# Patient Record
Sex: MALE | Race: ASIAN | Employment: FULL TIME | URBAN - METROPOLITAN AREA
[De-identification: names, ages, dates, MRNs, and addresses within clinical notes are randomized per-mention and may not be internally consistent; named-entity substitution may affect disease eponyms.]

---

## 2020-06-18 ENCOUNTER — OFFICE VISIT (OUTPATIENT)
Dept: PRIMARY CARE CLINIC | Age: 50
End: 2020-06-18

## 2020-06-18 VITALS
RESPIRATION RATE: 16 BRPM | WEIGHT: 174.6 LBS | SYSTOLIC BLOOD PRESSURE: 145 MMHG | HEIGHT: 68 IN | OXYGEN SATURATION: 99 % | HEART RATE: 62 BPM | DIASTOLIC BLOOD PRESSURE: 90 MMHG | BODY MASS INDEX: 26.46 KG/M2 | TEMPERATURE: 97.6 F

## 2020-06-18 DIAGNOSIS — J34.2 DEVIATED NASAL SEPTUM: ICD-10-CM

## 2020-06-18 DIAGNOSIS — Z12.5 PROSTATE CANCER SCREENING: ICD-10-CM

## 2020-06-18 DIAGNOSIS — L85.3 DRY SKIN: ICD-10-CM

## 2020-06-18 DIAGNOSIS — Z00.00 ANNUAL PHYSICAL EXAM: Primary | ICD-10-CM

## 2020-06-18 DIAGNOSIS — E55.9 VITAMIN D DEFICIENCY: ICD-10-CM

## 2020-06-18 DIAGNOSIS — R53.83 FATIGUE, UNSPECIFIED TYPE: ICD-10-CM

## 2020-06-18 RX ORDER — AMMONIUM LACTATE 12 G/100G
CREAM TOPICAL 2 TIMES DAILY
Qty: 280 G | Refills: 1 | Status: SHIPPED | OUTPATIENT
Start: 2020-06-18 | End: 2020-09-21 | Stop reason: SDUPTHER

## 2020-06-18 RX ORDER — AMMONIUM LACTATE 12 G/100G
CREAM TOPICAL 2 TIMES DAILY
Qty: 280 G | Refills: 1 | Status: SHIPPED | OUTPATIENT
Start: 2020-06-18 | End: 2020-06-18 | Stop reason: SDUPTHER

## 2020-06-18 NOTE — PROGRESS NOTES
Chief Complaint   Patient presents with   [facility].     no other concerns       1. Have you been to the ER, urgent care clinic since your last visit? Hospitalized since your last visit? No    2. Have you seen or consulted any other health care providers outside of the [address] [place] [address] since your last visit? Include any pap smears or colon screening.  No

## 2020-06-18 NOTE — PROGRESS NOTES
Yunior Solomon is a 44 y.o.  male and presents with     Chief Complaint   Patient presents with   [facility].     no other concerns     Pt is here for a physical.  Father had DM. He  at 67 off Tuberculosis  Mothers dad had DM. Pt does not smoke or drink alcohol. Pt says in early 80 when he was in [place] , he had anemia, he had EGD and colonoscopy done and they were normal. They could not find cause of anemia  Pt has h/o high chol. Pt eats white meat and chicken and fish. No chest pain, SOB. Once in a while he notices some blood in his stool and occasional constipation. Last TDAP ws 7 years back. 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 Pt feels occasional fatigue, daughter has thyroid problems. Pt complains of dry skin on his legs  He snores occasionally , not loud. History reviewed. No pertinent past medical history. History reviewed. No pertinent surgical history. Current Outpatient Medications   Medication Sig    ammonium lactate (AMLACTIN) 12 % topical cream Apply  to affected area two (2) times a day. rub in to affected area well     No current facility-administered medications for this visit. Health Maintenance   Topic Date Due    DTaP/Tdap/Td series (1 - Tdap) 1989    Lipid Screen  2008    Shingrix Vaccine Age 50> (1 of 2) 2018    FOBT Q1Y Age 54-65  2018    Influenza Age 5 to Adult  2020    Pneumococcal 0-64 years  Aged Out       There is no immunization history on file for this patient. No LMP for male patient. Allergies and Intolerances:   No Known Allergies    Family History:   Family History   Problem Relation Age of Onset    Diabetes Father        Social History:   He  reports that he has never smoked. He has never used smokeless tobacco.  He  reports no history of alcohol use.             Review of Systems:   General: negative for - chills, fatigue, fever, weight change  Psych: negative for - anxiety, depression, irritability or mood swings  ENT: negative for - headaches, hearing change, nasal congestion, oral lesions, sneezing or sore throat  Heme/ Lymph: negative for - bleeding problems, bruising, pallor or swollen lymph nodes  Endo: negative for - hot flashes, polydipsia/polyuria or temperature intolerance  Resp: negative for - cough, shortness of breath or wheezing  CV: negative for - chest pain, edema or palpitations  GI: negative for - abdominal pain, change in bowel habits, constipation, diarrhea or nausea/vomiting  : negative for - dysuria, hematuria, incontinence, pelvic pain or vulvar/vaginal symptoms  MSK: negative for - joint pain, joint swelling or muscle pain  Neuro: negative for - confusion, headaches, seizures or weakness  Derm: negative for - dry skin, hair changes, rash or skin lesion changes          Physical:   Vitals:   Vitals:    06/18/20 0814 06/18/20 0840   BP: 145/82 145/90   Pulse: 62    Resp: 16    Temp: 97.6 °F (36.4 °C)    TempSrc: Oral    SpO2: 99%    Weight: 174 lb 9.6 oz (79.2 kg)    Height: 5' 8\" (1.727 m)            Exam:   HEENT- atraumatic,normocephalic, awake, oriented, well nourished, deviated septum to the left  Neck - supple,no enlarged lymph nodes, no JVD, no thyromegaly  Chest- CTA, no rhonchi, no crackles  Heart- rrr, no murmurs / gallop/rub  Abdomen- soft,BS+,NT, no hepatosplenomegaly  Ext - no c/c/edema , dryness on lwer extremities. Neuro- no focal deficits. Power 5/5 all extremities  Skin - warm,dry, no obvious rashes. Review of Data:   LABS:   No results found for: WBC, HGB, HCT, PLT, HGBEXT, HCTEXT, PLTEXT, HGBEXT, HCTEXT, PLTEXT  No results found for: NA, K, CL, CO2, GLU, BUN, CREA  No results found for: CHOL, CHOLX, CHLST, CHOLV, HDL, HDLP, LDL, LDLC, DLDLP, TGLX, TRIGL, TRIGP  No components found for: GPT        Impression / Plan:        ICD-10-CM ICD-9-CM    1.  Annual physical exam Z00.00 V70.0 CBC WITH AUTOMATED DIFF      METABOLIC PANEL, COMPREHENSIVE LIPID PANEL      TSH 3RD GENERATION      AMB POC EKG ROUTINE W/ 12 LEADS, INTER & REP   2. Vitamin D deficiency E55.9 268.9 VITAMIN D, 25 HYDROXY   3. Prostate cancer screening Z12.5 V76.44 PSA, DIAGNOSTIC (PROSTATE SPECIFIC AG)      PSA, DIAGNOSTIC (PROSTATE SPECIFIC AG)   4. Dry skin L85.3 701.1 ammonium lactate (AMLACTIN) 12 % topical cream      DISCONTINUED: ammonium lactate (AMLACTIN) 12 % topical cream   5. Deviated nasal septum J34.2 470    6. Fatigue, unspecified type R53.83 780.79 TSH 3RD GENERATION     Low salt diet. , monitor blood pressure. EKG - shows sinus bradycardia. Explained to patient risk benefits of the medications. Advised patient to stop meds if having any side effects. Pt verbalized understanding of the instructions. I have discussed the diagnosis with the patient and the intended plan as seen in the above orders. The patient has received an after-visit summary and questions were answered concerning future plans. I have discussed medication side effects and warnings with the patient as well. I have reviewed the plan of care with the patient, accepted their input and they are in agreement with the treatment goals. Reviewed plan of care. Patient has provided input and agrees with goals. Follow-up and Dispositions    · Return in about 1 year (around 6/18/2021).          Bernardo Weber MD

## 2020-06-19 ENCOUNTER — TELEPHONE (OUTPATIENT)
Dept: PRIMARY CARE CLINIC | Age: 50
End: 2020-06-19

## 2020-06-19 DIAGNOSIS — D69.6 THROMBOCYTOPENIA (HCC): ICD-10-CM

## 2020-06-19 DIAGNOSIS — R79.89 ELEVATED TSH: Primary | ICD-10-CM

## 2020-06-19 DIAGNOSIS — E55.9 VITAMIN D DEFICIENCY: ICD-10-CM

## 2020-06-19 LAB
25(OH)D3+25(OH)D2 SERPL-MCNC: 17.8 NG/ML (ref 30–100)
ALBUMIN SERPL-MCNC: 4.3 G/DL (ref 3.8–4.9)
ALBUMIN/GLOB SERPL: 1.6 {RATIO} (ref 1.2–2.2)
ALP SERPL-CCNC: 103 IU/L (ref 39–117)
ALT SERPL-CCNC: 13 IU/L (ref 0–44)
AST SERPL-CCNC: 16 IU/L (ref 0–40)
BASOPHILS # BLD AUTO: 0 X10E3/UL (ref 0–0.2)
BASOPHILS NFR BLD AUTO: 1 %
BILIRUB SERPL-MCNC: 0.6 MG/DL (ref 0–1.2)
BUN SERPL-MCNC: 8 MG/DL (ref 6–24)
BUN/CREAT SERPL: 7 (ref 9–20)
CALCIUM SERPL-MCNC: 9.3 MG/DL (ref 8.7–10.2)
CHLORIDE SERPL-SCNC: 104 MMOL/L (ref 96–106)
CHOLEST SERPL-MCNC: 186 MG/DL (ref 100–199)
CO2 SERPL-SCNC: 24 MMOL/L (ref 20–29)
CREAT SERPL-MCNC: 1.17 MG/DL (ref 0.76–1.27)
EOSINOPHIL # BLD AUTO: 0.1 X10E3/UL (ref 0–0.4)
EOSINOPHIL NFR BLD AUTO: 1 %
ERYTHROCYTE [DISTWIDTH] IN BLOOD BY AUTOMATED COUNT: 12.6 % (ref 11.6–15.4)
GLOBULIN SER CALC-MCNC: 2.7 G/DL (ref 1.5–4.5)
GLUCOSE SERPL-MCNC: 94 MG/DL (ref 65–99)
HCT VFR BLD AUTO: 45.3 % (ref 37.5–51)
HDLC SERPL-MCNC: 45 MG/DL
HGB BLD-MCNC: 15.1 G/DL (ref 13–17.7)
IMM GRANULOCYTES # BLD AUTO: 0 X10E3/UL (ref 0–0.1)
IMM GRANULOCYTES NFR BLD AUTO: 0 %
LDLC SERPL CALC-MCNC: 103 MG/DL (ref 0–99)
LYMPHOCYTES # BLD AUTO: 1.8 X10E3/UL (ref 0.7–3.1)
LYMPHOCYTES NFR BLD AUTO: 30 %
MCH RBC QN AUTO: 30.8 PG (ref 26.6–33)
MCHC RBC AUTO-ENTMCNC: 33.3 G/DL (ref 31.5–35.7)
MCV RBC AUTO: 92 FL (ref 79–97)
MONOCYTES # BLD AUTO: 0.6 X10E3/UL (ref 0.1–0.9)
MONOCYTES NFR BLD AUTO: 9 %
MORPHOLOGY BLD-IMP: ABNORMAL
NEUTROPHILS # BLD AUTO: 3.7 X10E3/UL (ref 1.4–7)
NEUTROPHILS NFR BLD AUTO: 59 %
PLATELET # BLD AUTO: 90 X10E3/UL (ref 150–450)
POTASSIUM SERPL-SCNC: 4 MMOL/L (ref 3.5–5.2)
PROT SERPL-MCNC: 7 G/DL (ref 6–8.5)
PSA SERPL-MCNC: 0.6 NG/ML (ref 0–4)
RBC # BLD AUTO: 4.91 X10E6/UL (ref 4.14–5.8)
SODIUM SERPL-SCNC: 139 MMOL/L (ref 134–144)
TRIGL SERPL-MCNC: 191 MG/DL (ref 0–149)
TSH SERPL DL<=0.005 MIU/L-ACNC: 55.79 UIU/ML (ref 0.45–4.5)
VLDLC SERPL CALC-MCNC: 38 MG/DL (ref 5–40)
WBC # BLD AUTO: 6.1 X10E3/UL (ref 3.4–10.8)

## 2020-06-19 RX ORDER — MELATONIN
1000 DAILY
Qty: 90 TAB | Refills: 1 | Status: SHIPPED | OUTPATIENT
Start: 2020-06-19 | End: 2020-09-21 | Stop reason: SDUPTHER

## 2020-06-19 NOTE — PROGRESS NOTES
TSh is very elevated. Pt could have thyroud working less than normal.  I have ordered repeat TSH , free T3 and free T4 that pt needs to do in 2 weeks. Also platelet count are low, It could be lab error, But I want to make sure. I have ordered repeat CBC in 2 weeks. Vitamin D is low. I have sent vitmain D to pharmacy. I would like to see pt in follow up in 3 months.

## 2020-06-19 NOTE — TELEPHONE ENCOUNTER
----- Message from Pamela Mosqueda MD sent at 6/19/2020  8:57 AM EDT -----  Rogelio Grande is very elevated. Pt could have thyroud working less than normal.  I have ordered repeat TSH , free T3 and free T4 that pt needs to do in 2 weeks. Also platelet count are low, It could be lab error, But I want to make sure. I have ordered repeat CBC in 2 weeks. Vitamin D is low. I have sent vitmain D to pharmacy. I would like to see pt in follow up in 3 months.

## 2020-06-19 NOTE — TELEPHONE ENCOUNTER
Results and recommendations discussed with patient, he verbalized his understanding and is set up to have 2 week f/u lab and office visit in 3 mos. Patient has requested that the appointment reminders be sent to his home address. I will do that for him.

## 2020-07-02 DIAGNOSIS — R79.89 ELEVATED TSH: ICD-10-CM

## 2020-07-03 LAB
BASOPHILS # BLD AUTO: 0 X10E3/UL (ref 0–0.2)
BASOPHILS NFR BLD AUTO: 1 %
EOSINOPHIL # BLD AUTO: 0.1 X10E3/UL (ref 0–0.4)
EOSINOPHIL NFR BLD AUTO: 2 %
ERYTHROCYTE [DISTWIDTH] IN BLOOD BY AUTOMATED COUNT: 12.8 % (ref 11.6–15.4)
HCT VFR BLD AUTO: 46.8 % (ref 37.5–51)
HGB BLD-MCNC: 15.1 G/DL (ref 13–17.7)
IMM GRANULOCYTES # BLD AUTO: 0 X10E3/UL (ref 0–0.1)
IMM GRANULOCYTES NFR BLD AUTO: 0 %
LYMPHOCYTES # BLD AUTO: 2.3 X10E3/UL (ref 0.7–3.1)
LYMPHOCYTES NFR BLD AUTO: 38 %
MCH RBC QN AUTO: 30.1 PG (ref 26.6–33)
MCHC RBC AUTO-ENTMCNC: 32.3 G/DL (ref 31.5–35.7)
MCV RBC AUTO: 93 FL (ref 79–97)
MONOCYTES # BLD AUTO: 0.6 X10E3/UL (ref 0.1–0.9)
MONOCYTES NFR BLD AUTO: 10 %
NEUTROPHILS # BLD AUTO: 3 X10E3/UL (ref 1.4–7)
NEUTROPHILS NFR BLD AUTO: 49 %
PLATELET # BLD AUTO: 106 X10E3/UL (ref 150–450)
RBC # BLD AUTO: 5.02 X10E6/UL (ref 4.14–5.8)
T3FREE SERPL-MCNC: 2.5 PG/ML (ref 2–4.4)
T4 FREE SERPL-MCNC: 0.48 NG/DL (ref 0.82–1.77)
TSH SERPL DL<=0.005 MIU/L-ACNC: 57.89 UIU/ML (ref 0.45–4.5)
WBC # BLD AUTO: 6 X10E3/UL (ref 3.4–10.8)

## 2020-07-08 ENCOUNTER — TELEPHONE (OUTPATIENT)
Dept: PRIMARY CARE CLINIC | Age: 50
End: 2020-07-08

## 2020-07-08 DIAGNOSIS — H66.92 OTITIS OF LEFT EAR: ICD-10-CM

## 2020-07-08 DIAGNOSIS — E03.9 ACQUIRED HYPOTHYROIDISM: Primary | ICD-10-CM

## 2020-07-08 DIAGNOSIS — D69.6 THROMBOCYTOPENIA (HCC): ICD-10-CM

## 2020-07-08 RX ORDER — AMOXICILLIN 500 MG/1
500 CAPSULE ORAL 3 TIMES DAILY
Qty: 30 CAP | Refills: 0 | Status: SHIPPED | OUTPATIENT
Start: 2020-07-08 | End: 2020-07-18

## 2020-07-08 RX ORDER — LEVOTHYROXINE SODIUM 50 UG/1
50 TABLET ORAL
Qty: 30 TAB | Refills: 1 | Status: SHIPPED | OUTPATIENT
Start: 2020-07-08 | End: 2020-09-07

## 2020-07-08 NOTE — PROGRESS NOTES
Platelets have improved slightly from 90 thousand to 106 thousand but they are still low. I am referring pt to Hematology. TSH is high and free T4 is low. I am starting pt on levothyroxine 50 mcg po daily and will repeat labs in 6 weeks.

## 2020-07-08 NOTE — TELEPHONE ENCOUNTER
----- Message from Vasu Alexis MD sent at 7/8/2020  9:27 AM EDT -----  Platelets have improved slightly from 90 thousand to 106 thousand but they are still low. I am referring pt to Hematology. TSH is high and free T4 is low. I am starting pt on levothyroxine 50 mcg po daily and will repeat labs in 6 weeks.

## 2020-07-16 ENCOUNTER — TELEPHONE (OUTPATIENT)
Dept: ONCOLOGY | Age: 50
End: 2020-07-16

## 2020-07-16 NOTE — TELEPHONE ENCOUNTER
Patient called to schedule a new pt appointment. Patient scheduled for 8/5. Patient does not want to a virtual appointment he would like to come into the office.

## 2020-08-06 ENCOUNTER — OFFICE VISIT (OUTPATIENT)
Dept: ONCOLOGY | Age: 50
End: 2020-08-06
Payer: COMMERCIAL

## 2020-08-06 VITALS
HEART RATE: 78 BPM | DIASTOLIC BLOOD PRESSURE: 83 MMHG | TEMPERATURE: 97.1 F | SYSTOLIC BLOOD PRESSURE: 152 MMHG | WEIGHT: 171.2 LBS | BODY MASS INDEX: 25.94 KG/M2 | OXYGEN SATURATION: 98 % | HEIGHT: 68 IN

## 2020-08-06 DIAGNOSIS — E03.9 HYPOTHYROIDISM, UNSPECIFIED TYPE: ICD-10-CM

## 2020-08-06 DIAGNOSIS — D69.6 THROMBOCYTOPENIA (HCC): Primary | ICD-10-CM

## 2020-08-06 PROCEDURE — 99243 OFF/OP CNSLTJ NEW/EST LOW 30: CPT | Performed by: INTERNAL MEDICINE

## 2020-08-06 NOTE — PROGRESS NOTES
Deneen Lyles is a 44 y.o. male  Chief Complaint   Patient presents with    New Patient    Thrombocytopenia     1. Have you been to the ER, urgent care clinic since your last visit? Hospitalized since your last visit? no    2. Have you seen or consulted any other health care providers outside of the [address] [place] [address] since your last visit? Include any pap smears or colon screening.  no

## 2020-08-06 NOTE — PROGRESS NOTES
[facility] at [address], [address] [place] [address] Road, [address] [place] [address] Kareen Christine Bras: [phone]  F: [phone]    Reason for Visit:   Rocael Lockhart is a 44 y.o. male who is seen in consultation at the request of Dr. Alex Garcia  for evaluation of thrombocytopenia    History of Present Illness:   Patient is a 44 y.o. male seen for thrombocytopenia    He is healthy, has hypothyroidism and VD deficiency  States he has a h/o anemia and had been on iron supplements  He has has not been taking any OTC supplements. He States he has no bleeding, he rarely notices blood in stool, no bruising, no hematuria. He has had rare chills, he has no fevers, lumps, no belly fullness and no loss an appetite. He has no HA, falls, CP or SOB. He does not eat red meat  No h/o blood transfusions      He states that he had a normal colonoscopy in 2007    Sister has low platelets    PMH and PSH reviewed    Hypothyroidism    Social History     Tobacco Use    Smoking status: Never Smoker    Smokeless tobacco: Never Used   Substance Use Topics    Alcohol use: No      Family History   Problem Relation Age of Onset    Diabetes Father      Current Outpatient Medications   Medication Sig    levothyroxine (SYNTHROID) 50 mcg tablet Take 1 Tab by mouth Daily (before breakfast).  cholecalciferol (VITAMIN D3) (1000 Units /25 mcg) tablet Take 1 Tab by mouth daily.  ammonium lactate (AMLACTIN) 12 % topical cream Apply  to affected area two (2) times a day. rub in to affected area well     No current facility-administered medications for this visit. No Known Allergies     Review of Systems: A complete review of systems was obtained, negative except as described above.     Physical Exam:     Visit Vitals  /83   Pulse 78   Temp 97.1 °F (36.2 °C)   Ht 5' 8\" (1.727 m)   Wt 171 lb 3.2 oz (77.7 kg)   SpO2 98%   BMI 26.03 kg/m²     ECOG PS: 0  General: No distress  Eyes: PERRLA, anicteric sclerae  HENT: Atraumatic, OP clear  Neck: Supple  Lymphatic: No cervical, supraclavicular, or inguinal adenopathy  Respiratory: normal respiratory effort  CV: Normal rate, no peripheral edema  GI: Soft, nontender, nondistended, no masses, no hepatomegaly, no splenomegaly  MS: Normal gait and station. Digits without clubbing or cyanosis. Skin: No rashes, ecchymoses, or petechiae. Normal temperature, turgor, and texture. Psych: Alert, oriented, appropriate affect, normal judgment/insight    Results:     Lab Results   Component Value Date/Time    WBC 6.0 07/02/2020 08:16 AM    HGB 15.1 07/02/2020 08:16 AM    HCT 46.8 07/02/2020 08:16 AM    PLATELET 720 (L) 95/42/9692 08:16 AM    MCV 93 07/02/2020 08:16 AM    ABS. NEUTROPHILS 3.0 07/02/2020 08:16 AM     Lab Results   Component Value Date/Time    Sodium 139 06/18/2020 09:02 AM    Potassium 4.0 06/18/2020 09:02 AM    Chloride 104 06/18/2020 09:02 AM    CO2 24 06/18/2020 09:02 AM    Glucose 94 06/18/2020 09:02 AM    BUN 8 06/18/2020 09:02 AM    Creatinine 1.17 06/18/2020 09:02 AM    GFR est AA 83 06/18/2020 09:02 AM    GFR est non-AA 72 06/18/2020 09:02 AM    Calcium 9.3 06/18/2020 09:02 AM     Lab Results   Component Value Date/Time    Bilirubin, total 0.6 06/18/2020 09:02 AM    ALT (SGPT) 13 06/18/2020 09:02 AM    Alk. phosphatase 103 06/18/2020 09:02 AM    Protein, total 7.0 06/18/2020 09:02 AM    Albumin 4.3 06/18/2020 09:02 AM         Records reviewed and summarized above. Pathology report(s) reviewed above. Radiology report(s) reviewed above.       Assessment:   1) Mild thrombocytopenia    Chronicity unknown  No other CBC abnormalities  No culprit medications    No B symptoms and no h/o alcohol use    Etiologies include pseudothrombocytopenia, liver disease, B12 def, viral infections, ITP and BM disorders    Work up as below    2) Hypothyroidism    3) VD defiicency    4) HM  Due for colonoscopy      Plan:     · Cbc in citrate tube, smear, HIV, Hep C, Hep B, USG abdomen, B12  · RTC 5 weeks VV    I appreciate the opportunity to participate in Mr. [facility] memo.     Signed By: Babatunde Rosen MD

## 2020-08-21 ENCOUNTER — HOSPITAL ENCOUNTER (OUTPATIENT)
Dept: ULTRASOUND IMAGING | Age: 50
Discharge: HOME | End: 2020-08-21
Attending: INTERNAL MEDICINE
Payer: COMMERCIAL

## 2020-08-21 DIAGNOSIS — I51.9 MYXEDEMA HEART DISEASE: ICD-10-CM

## 2020-08-21 DIAGNOSIS — D69.6 THROMBOCYTOPENIA (HCC): ICD-10-CM

## 2020-08-21 DIAGNOSIS — E03.9 MYXEDEMA HEART DISEASE: ICD-10-CM

## 2020-08-21 DIAGNOSIS — E03.9 MYXEDEMA HEART DISEASE: Primary | ICD-10-CM

## 2020-08-21 DIAGNOSIS — I51.9 MYXEDEMA HEART DISEASE: Primary | ICD-10-CM

## 2020-08-21 PROCEDURE — 76700 US EXAM ABDOM COMPLETE: CPT

## 2020-08-25 ENCOUNTER — TELEPHONE (OUTPATIENT)
Dept: ONCOLOGY | Age: 50
End: 2020-08-25

## 2020-08-25 NOTE — TELEPHONE ENCOUNTER
Patient called and stated that he would like to know if his results from the ultrasound are in.  # [phone]

## 2020-08-25 NOTE — TELEPHONE ENCOUNTER
No obvious abnormality on US. Results will all be discussed at upcoming OV--please ensure he also gets his lab work done prior to visit.

## 2020-08-25 NOTE — TELEPHONE ENCOUNTER
Spoke to Pt, HERMINIO verified, Pt advised no obvious abnormalities but would discuss at next office visit. Pt reports he will get labs drawn tomorrow.

## 2020-08-27 LAB
T4 FREE SERPL-MCNC: 1.05 NG/DL (ref 0.82–1.77)
TSH SERPL DL<=0.005 MIU/L-ACNC: 6.15 UIU/ML (ref 0.45–4.5)

## 2020-08-28 LAB
BASOPHILS # BLD AUTO: 0 X10E3/UL (ref 0–0.2)
BASOPHILS NFR BLD AUTO: 1 %
EOSINOPHIL # BLD AUTO: 0.1 X10E3/UL (ref 0–0.4)
EOSINOPHIL NFR BLD AUTO: 1 %
ERYTHROCYTE [DISTWIDTH] IN BLOOD BY AUTOMATED COUNT: 13.2 % (ref 11.6–15.4)
HBV SURFACE AG SERPL QL IA: NEGATIVE
HCT VFR BLD AUTO: 46.4 % (ref 37.5–51)
HCV AB S/CO SERPL IA: <0.1 S/CO RATIO (ref 0–0.9)
HGB BLD-MCNC: 15.5 G/DL (ref 13–17.7)
HIV 1+2 AB+HIV1 P24 AG SERPL QL IA: NON REACTIVE
IMM GRANULOCYTES # BLD AUTO: 0 X10E3/UL (ref 0–0.1)
IMM GRANULOCYTES NFR BLD AUTO: 0 %
LYMPHOCYTES # BLD AUTO: 1.9 X10E3/UL (ref 0.7–3.1)
LYMPHOCYTES NFR BLD AUTO: 34 %
MCH RBC QN AUTO: 30.2 PG (ref 26.6–33)
MCHC RBC AUTO-ENTMCNC: 33.4 G/DL (ref 31.5–35.7)
MCV RBC AUTO: 90 FL (ref 79–97)
MONOCYTES # BLD AUTO: 0.6 X10E3/UL (ref 0.1–0.9)
MONOCYTES NFR BLD AUTO: 10 %
MORPHOLOGY BLD-IMP: ABNORMAL
NEUTROPHILS # BLD AUTO: 3.1 X10E3/UL (ref 1.4–7)
NEUTROPHILS NFR BLD AUTO: 54 %
PATH REV BLD -IMP: ABNORMAL
PATHOLOGIST NAME: ABNORMAL
PLATELET # BLD AUTO: 90 X10E3/UL (ref 150–450)
PLATELET # BLD AUTO: 93 X10E3/UL (ref 150–450)
RBC # BLD AUTO: 5.13 X10E6/UL (ref 4.14–5.8)
VIT B12 SERPL-MCNC: 229 PG/ML (ref 232–1245)
WBC # BLD AUTO: 5.7 X10E3/UL (ref 3.4–10.8)

## 2020-09-01 ENCOUNTER — TELEPHONE (OUTPATIENT)
Dept: ONCOLOGY | Age: 50
End: 2020-09-01

## 2020-09-01 NOTE — TELEPHONE ENCOUNTER
Yes they are in and will be discussed at upcoming [address], please let him know. PLT are 90K.

## 2020-09-07 DIAGNOSIS — E03.9 ACQUIRED HYPOTHYROIDISM: ICD-10-CM

## 2020-09-07 RX ORDER — LEVOTHYROXINE SODIUM 50 UG/1
TABLET ORAL
Qty: 30 TAB | Refills: 0 | Status: SHIPPED | OUTPATIENT
Start: 2020-09-07 | End: 2020-09-21 | Stop reason: SDUPTHER

## 2020-09-09 ENCOUNTER — VIRTUAL VISIT (OUTPATIENT)
Dept: ONCOLOGY | Age: 50
End: 2020-09-09
Payer: COMMERCIAL

## 2020-09-09 DIAGNOSIS — D69.6 THROMBOCYTOPENIA (HCC): Primary | ICD-10-CM

## 2020-09-09 DIAGNOSIS — E53.8 B12 DEFICIENCY: ICD-10-CM

## 2020-09-09 DIAGNOSIS — E03.9 HYPOTHYROIDISM, UNSPECIFIED TYPE: ICD-10-CM

## 2020-09-09 PROCEDURE — 99213 OFFICE O/P EST LOW 20 MIN: CPT | Performed by: INTERNAL MEDICINE

## 2020-09-09 NOTE — PROGRESS NOTES
[facility] at [place] [address] [address], [phone]  F: [phone]    Reason for Visit:   Milton Mccray is a 44 y.o. male who is seen by synchronous (real-time) audio-video technology on 2020 for follow up of  evaluation of thrombocytopenia    History of Present Illness:   Patient is a 44 y.o. male seen for thrombocytopenia    He is healthy, has hypothyroidism and VD deficiency  States he has a h/o anemia and had been on iron supplements  He has has not been taking any OTC supplements. He States he has no bleeding and no change in health since first seen    Here to review results    He does not eat red meat  No h/o blood transfusions      He states that he had a normal colonoscopy in     Sister has low platelets    PMH and PSH reviewed    Hypothyroidism    Social History     Tobacco Use    Smoking status: Never Smoker    Smokeless tobacco: Never Used   Substance Use Topics    Alcohol use: No      Family History   Problem Relation Age of Onset    Diabetes Father      Current Outpatient Medications   Medication Sig    Euthyrox 50 mcg tablet TAKE 1 TABLET BY MOUTH ONCE DAILY BEFORE BREAKFAST    cholecalciferol (VITAMIN D3) (1000 Units /25 mcg) tablet Take 1 Tab by mouth daily.  ammonium lactate (AMLACTIN) 12 % topical cream Apply  to affected area two (2) times a day. rub in to affected area well     No current facility-administered medications for this visit. No Known Allergies     Review of Systems: A complete review of systems was obtained, negative except as described above. Physical Exam:     Due to this being a TeleHealth evaluation, many elements of the physical examination are unable to be assessed.      Constitutional: Appears well-developed and well-nourished in no apparent distress   Mental status: Alert and awake, Oriented to person/place/time, Able to follow commands  Eyes: EOM normal, Sclera normal, No visible ocular discharge  Skin: No significant exanthematous lesions or discoloration noted on facial skin  Psychiatric: Normal affect, normal judgment/insight. No hallucinations       Results:     Lab Results   Component Value Date/Time    WBC 5.7 08/26/2020 08:55 AM    HGB 15.5 08/26/2020 08:55 AM    HCT 46.4 08/26/2020 08:55 AM    PLATELET 93 (LL) 40/38/2036 08:55 AM    MCV 90 08/26/2020 08:55 AM    ABS. NEUTROPHILS 3.1 08/26/2020 08:55 AM     Lab Results   Component Value Date/Time    Sodium 139 06/18/2020 09:02 AM    Potassium 4.0 06/18/2020 09:02 AM    Chloride 104 06/18/2020 09:02 AM    CO2 24 06/18/2020 09:02 AM    Glucose 94 06/18/2020 09:02 AM    BUN 8 06/18/2020 09:02 AM    Creatinine 1.17 06/18/2020 09:02 AM    GFR est AA 83 06/18/2020 09:02 AM    GFR est non-AA 72 06/18/2020 09:02 AM    Calcium 9.3 06/18/2020 09:02 AM     Lab Results   Component Value Date/Time    Bilirubin, total 0.6 06/18/2020 09:02 AM    ALT (SGPT) 13 06/18/2020 09:02 AM    Alk. phosphatase 103 06/18/2020 09:02 AM    Protein, total 7.0 06/18/2020 09:02 AM    Albumin 4.3 06/18/2020 09:02 AM     Lab Results   Component Value Date/Time    Vitamin B12 229 (L) 08/26/2020 08:55 AM    TSH 6.150 (H) 08/26/2020 08:53 AM    Hep C Virus Ab <0.1 08/26/2020 08:55 AM    HIV SCREEN 4TH GENERATION WRFX Non Reactive 08/26/2020 08:55 AM     No results found for: INR, APTT, DDIMSQ, DDIME, [id number], Laverna Sorrento, FDPLT, Carmen Carolina, PRSFLT, [address], [address] [facility], [id number], [id number], [id number], [id number], [id number], [id number], INREXT     SMEAR in citrate tube  Actual platelet count may be somewhat higher than reported due to   aggregation of platelets in this sample. White count and platelets may be decreased due to fibrin strands   in the sample submitted. Records reviewed and summarized above. Pathology report(s) reviewed above. Radiology report(s) reviewed above. USG Abdomen 8/2020  IMPRESSION  IMPRESSION:   1.   Increased hepatic echotexture is suggestive but not diagnostic for hepatic  steatosis. No focal liver lesion or biliary abnormality. 2.  Normal sized spleen. Assessment:   1) Mild thrombocytopenia    Chronicity unknown  No other CBC abnormalities  No culprit medications  Work up is unremarkable though he does have evidence of pseudothrombocytopenia on examination in a citrate tube    He does have mild V B12 def which we will replace though contribution to this mild thrombocytopenia is unlikely      2) Hypothyroidism    3) VD defiicency    4) Vitamin B12 deficiency  Replace orally as below    5) Hepatic steatosis  Discuss with Dr. Walt Sen- consider hepatology referral at some point      Plan:     B12- 1000 mcg po daily  Folic acid daily  RTC 3 months with cbc and B12 levels    I appreciate the opportunity to participate in Mr. Lala hampton. Signed By: Beatriz Cleary MD      I was in the office while conducting this encounter. The patient was at his home. Consent:  He and/or his healthcare decision maker is aware that this patient-initiated Telehealth encounter is a billable service, with coverage as determined by his insurance carrier. He is aware that he may receive a bill and has provided verbal consent to proceed: Yes    Pursuant to the emergency declaration under the [employer] and the [facility], 1135 waiver authority and the [employer] and [employer] Act, this Virtual  Visit was conducted, with patient's (and/or legal guardian's) consent, to reduce the patient's risk of exposure to COVID-19 and provide necessary medical care. Services were provided through a video synchronous discussion virtually to substitute for in-person visit.

## 2020-09-09 NOTE — PROGRESS NOTES
Bessie Montalvo is a 44 y.o. male  Chief Complaint   Patient presents with    Follow-up    Thrombocytopenia     1. Have you been to the ER, urgent care clinic since your last visit? Hospitalized since your last visit? No    2. Have you seen or consulted any other health care providers outside of the [address] [place] [address] since your last visit? Include any pap smears or colon screening.  No

## 2020-09-21 ENCOUNTER — OFFICE VISIT (OUTPATIENT)
Dept: PRIMARY CARE CLINIC | Age: 50
End: 2020-09-21
Payer: COMMERCIAL

## 2020-09-21 VITALS
BODY MASS INDEX: 26.15 KG/M2 | HEART RATE: 57 BPM | SYSTOLIC BLOOD PRESSURE: 131 MMHG | RESPIRATION RATE: 16 BRPM | TEMPERATURE: 97.4 F | WEIGHT: 172 LBS | OXYGEN SATURATION: 100 % | DIASTOLIC BLOOD PRESSURE: 80 MMHG

## 2020-09-21 DIAGNOSIS — E03.9 ACQUIRED HYPOTHYROIDISM: ICD-10-CM

## 2020-09-21 DIAGNOSIS — Z12.11 COLON CANCER SCREENING: ICD-10-CM

## 2020-09-21 DIAGNOSIS — E53.8 B12 DEFICIENCY: Primary | ICD-10-CM

## 2020-09-21 DIAGNOSIS — L85.3 DRY SKIN: ICD-10-CM

## 2020-09-21 DIAGNOSIS — E55.9 VITAMIN D DEFICIENCY: ICD-10-CM

## 2020-09-21 PROCEDURE — 99214 OFFICE O/P EST MOD 30 MIN: CPT | Performed by: INTERNAL MEDICINE

## 2020-09-21 RX ORDER — AMMONIUM LACTATE 12 G/100G
CREAM TOPICAL 2 TIMES DAILY
Qty: 280 G | Refills: 1 | Status: SHIPPED | OUTPATIENT
Start: 2020-09-21 | End: 2021-07-26 | Stop reason: SDUPTHER

## 2020-09-21 RX ORDER — MELATONIN
1000 DAILY
Qty: 90 TAB | Refills: 1 | Status: SHIPPED | OUTPATIENT
Start: 2020-09-21 | End: 2021-03-24 | Stop reason: DRUGHIGH

## 2020-09-21 RX ORDER — LEVOTHYROXINE SODIUM 50 UG/1
50 TABLET ORAL
Qty: 90 TAB | Refills: 1 | Status: SHIPPED | OUTPATIENT
Start: 2020-09-21 | End: 2021-03-24 | Stop reason: DRUGHIGH

## 2020-09-21 NOTE — PROGRESS NOTES
Kelli Dennis is a 44 y.o.  male and presents with     Chief Complaint   Patient presents with    Hypothyroidism    Vitamin D Deficiency    Fatty Liver    Thrombocytopenia    Dry Skin     Pt saw Dr Lizzy Goodrich for low platelets. Work up revealed low vitamin B12 . Pt eats chicken. Pt is taking vitamin B12 OTC. Pt has occasional  Blood stained stool. Pt has not had colonoscopy  yet. Patient had an ultrasound of his abdomen that shows mild fatty liver. He wonders if he could do anything about it. He also has dry skin on his lower extremities and wants refills on his cream.  He is also running low on his thyroid medicine as well as vitamin D and wants refill. No past medical history on file. No past surgical history on file. Current Outpatient Medications   Medication Sig    levothyroxine (Euthyrox) 50 mcg tablet Take 1 Tab by mouth Daily (before breakfast).  cholecalciferol (VITAMIN D3) (1000 Units /25 mcg) tablet Take 1 Tab by mouth daily.  ammonium lactate (AMLACTIN) 12 % topical cream Apply  to affected area two (2) times a day. rub in to affected area well     No current facility-administered medications for this visit. Health Maintenance   Topic Date Due    DTaP/Tdap/Td series (1 - Tdap) 09/18/1989    Shingrix Vaccine Age 50> (1 of 2) 09/18/2018    FOBT Q1Y Age 50-75  09/18/2018    Flu Vaccine (1) 09/01/2020    Lipid Screen  06/18/2025    Pneumococcal 0-64 years  Aged Out       There is no immunization history on file for this patient. No LMP for male patient. Allergies and Intolerances:   No Known Allergies    Family History:   Family History   Problem Relation Age of Onset    Diabetes Father        Social History:   He  reports that he has never smoked. He has never used smokeless tobacco.  He  reports no history of alcohol use.             Review of Systems:   General: negative for - chills, fatigue, fever, weight change  Psych: negative for - anxiety, depression, irritability or mood swings  ENT: negative for - headaches, hearing change, nasal congestion, oral lesions, sneezing or sore throat  Heme/ Lymph: negative for - bleeding problems, bruising, pallor or swollen lymph nodes  Endo: negative for - hot flashes, polydipsia/polyuria or temperature intolerance  Resp: negative for - cough, shortness of breath or wheezing  CV: negative for - chest pain, edema or palpitations  GI: negative for - abdominal pain, change in bowel habits, constipation, diarrhea or nausea/vomiting  : negative for - dysuria, hematuria, incontinence, pelvic pain or vulvar/vaginal symptoms  MSK: negative for - joint pain, joint swelling or muscle pain  Neuro: negative for - confusion, headaches, seizures or weakness  Derm: negative for - dry skin, hair changes, rash or skin lesion changes          Physical:   Vitals:   Vitals:    09/21/20 0826   BP: 131/80   Pulse: (!) 57   Resp: 16   Temp: 97.4 °F (36.3 °C)   SpO2: 100%   Weight: 172 lb (78 kg)           Exam:   HEENT- atraumatic,normocephalic, awake, oriented, well nourished  Neck - supple,no enlarged lymph nodes, no JVD, no thyromegaly  Chest- CTA, no rhonchi, no crackles  Heart- rrr, no murmurs / gallop/rub  Abdomen- soft,BS+,NT, no hepatosplenomegaly  Ext - no c/c/edema   Neuro- no focal deficits. Power 5/5 all extremities  Skin - warm,dry, no obvious rashes. ,  Dry skin over lower extremities, especially the shins.           Review of Data:   LABS:   Lab Results   Component Value Date/Time    WBC 5.7 08/26/2020 08:55 AM    HGB 15.5 08/26/2020 08:55 AM    HCT 46.4 08/26/2020 08:55 AM    PLATELET 93 (LL) 94/88/4733 08:55 AM     Lab Results   Component Value Date/Time    Sodium 139 06/18/2020 09:02 AM    Potassium 4.0 06/18/2020 09:02 AM    Chloride 104 06/18/2020 09:02 AM    CO2 24 06/18/2020 09:02 AM    Glucose 94 06/18/2020 09:02 AM    BUN 8 06/18/2020 09:02 AM    Creatinine 1.17 06/18/2020 09:02 AM     Lab Results   Component Value Date/Time Cholesterol, total 186 06/18/2020 09:02 AM    HDL Cholesterol 45 06/18/2020 09:02 AM    LDL, calculated 103 (H) 06/18/2020 09:02 AM    Triglyceride 191 (H) 06/18/2020 09:02 AM     No components found for: GPT        Impression / Plan:        ICD-10-CM ICD-9-CM    1. B12 deficiency  E53.8 266.2 INTRINSIC FACTOR AB      PARIETAL CELL AB      PARIETAL CELL AB      INTRINSIC FACTOR AB   2. Vitamin D deficiency  E55.9 268.9 VITAMIN D, 25 HYDROXY      cholecalciferol (VITAMIN D3) (1000 Units /25 mcg) tablet      VITAMIN D, 25 HYDROXY   3. Colon cancer screening  Z12.11 V76.51 REFERRAL TO GASTROENTEROLOGY   4. Acquired hypothyroidism  E03.9 244.9 levothyroxine (Euthyrox) 50 mcg tablet   5. Dry skin  L85.3 701.1 ammonium lactate (AMLACTIN) 12 % topical cream         Explained to patient risk benefits of the medications. Advised patient to stop meds if having any side effects. Pt verbalized understanding of the instructions. I have discussed the diagnosis with the patient and the intended plan as seen in the above orders. The patient has received an after-visit summary and questions were answered concerning future plans. I have discussed medication side effects and warnings with the patient as well. I have reviewed the plan of care with the patient, accepted their input and they are in agreement with the treatment goals. Reviewed plan of care. Patient has provided input and agrees with goals. Follow-up and Dispositions    · Return in about 6 months (around 3/21/2021).          Cristian Rasmussen MD

## 2020-09-22 ENCOUNTER — TELEPHONE (OUTPATIENT)
Dept: PRIMARY CARE CLINIC | Age: 50
End: 2020-09-22

## 2020-09-22 LAB — PCA AB SER-ACNC: 50.2 UNITS (ref 0–20)

## 2020-09-22 NOTE — TELEPHONE ENCOUNTER
----- Message from Mateusz Jimenez MD sent at 9/22/2020  3:42 PM EDT -----  vitamin D level is slightly lower than normal but has improved from 17 to 27. 8. would advise taking the vitamin D daily.

## 2020-09-22 NOTE — PROGRESS NOTES
vitamin D level is slightly lower than normal but has improved from 17 to 27. 8. would advise taking the vitamin D daily.

## 2020-09-23 LAB
25(OH)D3 SERPL-MCNC: 27.8 NG/ML (ref 30–100)
IF BLOCK AB SER-ACNC: 1.1 AU/ML (ref 0–1.1)

## 2020-09-24 NOTE — PROGRESS NOTES
Called patient to review lab results. He stated that he understood. Patient also had other labs there were done on that day, they are visible through my chart however those numbers are off by a lot. He would like to know what that means.  Would like a call to explain to him

## 2020-11-25 DIAGNOSIS — D69.6 THROMBOCYTOPENIA (HCC): ICD-10-CM

## 2020-12-02 ENCOUNTER — TELEPHONE (OUTPATIENT)
Dept: ONCOLOGY | Age: 50
End: 2020-12-02

## 2020-12-02 NOTE — TELEPHONE ENCOUNTER
Patient called and stated that he is not able to see the results on my chart and would like to know if they can be released or if the results can be sent to him via email. Informed patient that once Dr. Naomi Roth reviews the results tomorrow, the office can send them. Patient stated that he would like them before the appointment so if he has questions he can ask them during his appointment.

## 2020-12-02 NOTE — TELEPHONE ENCOUNTER
Patient stated that he had his labs last week and would like to make sure the results are in before appointment tomorrow.

## 2020-12-03 ENCOUNTER — VIRTUAL VISIT (OUTPATIENT)
Dept: ONCOLOGY | Age: 50
End: 2020-12-03
Payer: COMMERCIAL

## 2020-12-03 DIAGNOSIS — E53.8 B12 DEFICIENCY: Primary | ICD-10-CM

## 2020-12-03 PROCEDURE — 99213 OFFICE O/P EST LOW 20 MIN: CPT | Performed by: INTERNAL MEDICINE

## 2020-12-03 NOTE — PROGRESS NOTES
[facility] [place] [facility] [place] [address] [address], [address] Felipe [phone]  F: [phone]    Reason for Visit:   Ysabel Parish is a 44 y.o. male who is seen for follow up of  thrombocytopenia    History of Present Illness:   Patient is a 44 y.o. male seen for thrombocytopenia    He is healthy, has hypothyroidism and VD deficiency  States he has a h/o anemia and had been on iron supplements  He has has not been taking any OTC supplements. He was found to have b12 deficiency and started on po b12. He had been taking this until last month when he stopped because of a rash. He feels well, has no bleeding, no sensory changes, no falls, no bleeding. He does not eat red meat  No h/o blood transfusions      He states that he had a normal colonoscopy in     Sister has low platelets    PMH and PSH reviewed    Hypothyroidism    Social History     Tobacco Use    Smoking status: Never Smoker    Smokeless tobacco: Never Used   Substance Use Topics    Alcohol use: No      Family History   Problem Relation Age of Onset    Diabetes Father      Current Outpatient Medications   Medication Sig    levothyroxine (Euthyrox) 50 mcg tablet Take 1 Tab by mouth Daily (before breakfast).  cholecalciferol (VITAMIN D3) (1000 Units /25 mcg) tablet Take 1 Tab by mouth daily.  ammonium lactate (AMLACTIN) 12 % topical cream Apply  to affected area two (2) times a day. rub in to affected area well     No current facility-administered medications for this visit. No Known Allergies     Review of Systems: A complete review of systems was obtained, negative except as described above. Physical Exam:     Due to this being a TeleHealth evaluation, many elements of the physical examination are unable to be assessed.      Constitutional: Appears well-developed and well-nourished in no apparent distress   Mental status: Alert and awake, Oriented to person/place/time, Able to follow commands  Eyes: EOM normal, Sclera normal, No visible ocular discharge  Skin: No significant exanthematous lesions or discoloration noted on facial skin  Psychiatric: Normal affect, normal judgment/insight. No hallucinations       Results:     Lab Results   Component Value Date/Time    WBC 5.7 08/26/2020 08:55 AM    HGB 15.5 08/26/2020 08:55 AM    HCT 46.4 08/26/2020 08:55 AM    PLATELET 93 (LL) 53/32/5024 08:55 AM    MCV 90 08/26/2020 08:55 AM    ABS. NEUTROPHILS 3.1 08/26/2020 08:55 AM     Lab Results   Component Value Date/Time    Sodium 139 06/18/2020 09:02 AM    Potassium 4.0 06/18/2020 09:02 AM    Chloride 104 06/18/2020 09:02 AM    CO2 24 06/18/2020 09:02 AM    Glucose 94 06/18/2020 09:02 AM    BUN 8 06/18/2020 09:02 AM    Creatinine 1.17 06/18/2020 09:02 AM    GFR est AA 83 06/18/2020 09:02 AM    GFR est non-AA 72 06/18/2020 09:02 AM    Calcium 9.3 06/18/2020 09:02 AM     Lab Results   Component Value Date/Time    Bilirubin, total 0.6 06/18/2020 09:02 AM    ALT (SGPT) 13 06/18/2020 09:02 AM    Alk. phosphatase 103 06/18/2020 09:02 AM    Protein, total 7.0 06/18/2020 09:02 AM    Albumin 4.3 06/18/2020 09:02 AM     Lab Results   Component Value Date/Time    Vitamin B12 229 (L) 08/26/2020 08:55 AM    TSH 6.150 (H) 08/26/2020 08:53 AM    Hep C Virus Ab <0.1 08/26/2020 08:55 AM    HIV SCREEN 4TH GENERATION WRFX Non Reactive 08/26/2020 08:55 AM     No results found for: INR, APTT, DDIMSQ, DDIME, [id number], [place], FDPLT, Coral Emily, [address] [facility], [facility], [address], [id number], [facility] [address], [id number], [id number], [id number], [id number], [facility] 11/2020  Platelets 247A,     SMEAR in citrate tube  Actual platelet count may be somewhat higher than reported due to   aggregation of platelets in this sample. White count and platelets may be decreased due to fibrin strands   in the sample submitted. Records reviewed and summarized above. Pathology report(s) reviewed above.   Radiology report(s) reviewed above. [facility] Abdomen 8/2020  IMPRESSION  IMPRESSION:   1. Increased hepatic echotexture is suggestive but not diagnostic for hepatic  steatosis. No focal liver lesion or biliary abnormality. 2.  Normal sized spleen. Assessment:   1) Mild thrombocytopenia    Secondary to B12 deficiency and pseudothrombocytopenia  An immune component is not ruled out  Stable counsts    2) Hypothyroidism    3) VD defiicency    4) Vitamin B12 deficiency/ pernicious anemia  Though B12 absorption is impaired in pernicious anemia high doses of oral B12 are often efficacious in replacement. He is not keen on IM replacement and I think its fine to continue 4443-2388 mcg po daily. His B12 levels have normalized  I strongly advice he gets an EGD      5) Hepatic steatosis        Plan:     B12- 1000 mcg po daily- CONTINUE- Hold off on IM replacement  Folic acid daily  Consider GI referral  RTC 3 months with cbc and B12 levels    I appreciate the opportunity to participate in Mr. Abdirahman Marrero care. Signed By: Meenu Mcconnell MD      I was in the office while conducting this encounter. The patient was at his home. Consent:  He and/or his healthcare decision maker is aware that this patient-initiated Telehealth encounter is a billable service, with coverage as determined by his insurance carrier. He is aware that he may receive a bill and has provided verbal consent to proceed: Yes    Pursuant to the emergency declaration under the [address] and the [facility], [address] waiver authority and the [employer] Act, this Virtual  Visit was conducted, with patient's (and/or legal guardian's) consent, to reduce the patient's risk of exposure to COVID-19 and provide necessary medical care. Services were provided through a video synchronous discussion virtually to substitute for in-person visit.

## 2020-12-03 NOTE — PROGRESS NOTES
Umair Steele is a 44 y.o. male  Chief Complaint   Patient presents with    Follow-up     thrombocytopenia     1. Have you been to the ER, urgent care clinic since your last visit? Hospitalized since your last visit? No    2. Have you seen or consulted any other health care providers outside of the [address] [place] [address] since your last visit? Include any pap smears or colon screening.   Yes, colonoscopy 10/2020

## 2021-03-17 ENCOUNTER — TELEPHONE (OUTPATIENT)
Dept: ONCOLOGY | Age: 51
End: 2021-03-17

## 2021-03-17 NOTE — TELEPHONE ENCOUNTER
Called pt to see if he remembered his apt today 3/17 but pt told me he did not get labs done - therefore I rescheduled him. Pt also stated he got labs done before his last apt in December of 2020 but pt does not see those results uploaded in my chart and would like them to be uploaded asap.     # [phone]

## 2021-03-17 NOTE — TELEPHONE ENCOUNTER
Results are automatically released now, I cannot release them to my knowledge. We can print out report and he can  or mail to his house.

## 2021-03-18 DIAGNOSIS — E55.9 VITAMIN D DEFICIENCY: Primary | ICD-10-CM

## 2021-03-18 DIAGNOSIS — E03.9 ACQUIRED HYPOTHYROIDISM: ICD-10-CM

## 2021-03-18 DIAGNOSIS — E53.8 B12 DEFICIENCY: ICD-10-CM

## 2021-03-24 ENCOUNTER — OFFICE VISIT (OUTPATIENT)
Dept: PRIMARY CARE CLINIC | Age: 51
End: 2021-03-24
Payer: COMMERCIAL

## 2021-03-24 VITALS
DIASTOLIC BLOOD PRESSURE: 77 MMHG | TEMPERATURE: 97.1 F | BODY MASS INDEX: 26.52 KG/M2 | HEART RATE: 64 BPM | RESPIRATION RATE: 16 BRPM | WEIGHT: 175 LBS | SYSTOLIC BLOOD PRESSURE: 125 MMHG | HEIGHT: 68 IN | OXYGEN SATURATION: 99 %

## 2021-03-24 DIAGNOSIS — E53.8 B12 DEFICIENCY: Primary | ICD-10-CM

## 2021-03-24 DIAGNOSIS — E03.9 ACQUIRED HYPOTHYROIDISM: ICD-10-CM

## 2021-03-24 DIAGNOSIS — D69.6 THROMBOCYTOPENIA (HCC): ICD-10-CM

## 2021-03-24 DIAGNOSIS — E55.9 VITAMIN D DEFICIENCY: ICD-10-CM

## 2021-03-24 DIAGNOSIS — D51.0 PERNICIOUS ANEMIA: ICD-10-CM

## 2021-03-24 PROCEDURE — 99214 OFFICE O/P EST MOD 30 MIN: CPT | Performed by: INTERNAL MEDICINE

## 2021-03-24 RX ORDER — LANOLIN ALCOHOL/MO/W.PET/CERES
CREAM (GRAM) TOPICAL DAILY
COMMUNITY
End: 2022-01-17

## 2021-03-24 RX ORDER — LEVOTHYROXINE SODIUM 75 UG/1
75 TABLET ORAL
Qty: 90 TAB | Refills: 1 | Status: SHIPPED | OUTPATIENT
Start: 2021-03-24 | End: 2021-07-26 | Stop reason: SDUPTHER

## 2021-03-24 RX ORDER — CYANOCOBALAMIN (VITAMIN B-12) 2500 MCG
1 TABLET, SUBLINGUAL SUBLINGUAL DAILY
Qty: 90 TAB | Refills: 1 | Status: SHIPPED | OUTPATIENT
Start: 2021-03-24 | End: 2021-04-05 | Stop reason: SDUPTHER

## 2021-03-24 RX ORDER — ACETAMINOPHEN 500 MG
2000 TABLET ORAL 2 TIMES DAILY
Qty: 90 CAP | Refills: 1 | Status: SHIPPED | OUTPATIENT
Start: 2021-03-24 | End: 2021-04-05 | Stop reason: SDUPTHER

## 2021-03-24 NOTE — PROGRESS NOTES
Parminder Olson is a 44 y.o.  male and presents with     Chief Complaint   Patient presents with    Labs    Anemia    Vitamin B12 Deficiency    Vitamin D Deficiency    Hypothyroidism     Patient is here for follow-up on lab results. He did see hematology for low platelets. It was felt that it could be due to low B12 levels secondary to pernicious anemia. Patient does not want to take injections of B12 for pernicious anemia and has been taking oral vitamin B12. Patient has not been taking vitamin D tablets as directed. Patient has had mild weight gain since last visit. Patient also reports some fatigue. TSH levels are elevated. Patient is not sure if the fatigue is related to his work or due to hypothyroidism. No past medical history on file. No past surgical history on file. Current Outpatient Medications   Medication Sig    thiamine HCL (Vitamin B-1) 100 mg tablet Take  by mouth daily.  cyanocobalamin-cobamamide (B-12) 5,000-100 mcg sublingual tablet 1 Tab by SubLINGual route daily.  cholecalciferol (VITAMIN D3) (2,000 UNITS /50 MCG) cap capsule Take 1 Cap by mouth two (2) times a day.  levothyroxine (SYNTHROID) 75 mcg tablet Take 1 Tab by mouth Daily (before breakfast).  ammonium lactate (AMLACTIN) 12 % topical cream Apply  to affected area two (2) times a day. rub in to affected area well     No current facility-administered medications for this visit. Health Maintenance   Topic Date Due    DTaP/Tdap/Td series (1 - Tdap) Never done    Shingrix Vaccine Age 50> (1 of 2) Never done    Colorectal Cancer Screening Combo  Never done    Lipid Screen  06/18/2025    Hepatitis C Screening  Completed    Flu Vaccine  Completed    Pneumococcal 0-64 years  Aged Out       There is no immunization history on file for this patient. No LMP for male patient.         Allergies and Intolerances:   No Known Allergies    Family History:   Family History   Problem Relation Age of Onset    Diabetes Father        Social History:   He  reports that he has never smoked. He has never used smokeless tobacco.  He  reports no history of alcohol use. Review of Systems:   General: negative for - chills, fatigue, fever, weight change  Psych: negative for - anxiety, depression, irritability or mood swings  ENT: negative for - headaches, hearing change, nasal congestion, oral lesions, sneezing or sore throat  Heme/ Lymph: negative for - bleeding problems, bruising, pallor or swollen lymph nodes  Endo: negative for - hot flashes, polydipsia/polyuria or temperature intolerance  Resp: negative for - cough, shortness of breath or wheezing  CV: negative for - chest pain, edema or palpitations  GI: negative for - abdominal pain, change in bowel habits, constipation, diarrhea or nausea/vomiting  : negative for - dysuria, hematuria, incontinence, pelvic pain or vulvar/vaginal symptoms  MSK: negative for - joint pain, joint swelling or muscle pain  Neuro: negative for - confusion, headaches, seizures or weakness  Derm: negative for - dry skin, hair changes, rash or skin lesion changes          Physical:   Vitals:   Vitals:    03/24/21 0813   BP: 125/77   Pulse: 64   Resp: 16   Temp: 97.1 °F (36.2 °C)   TempSrc: Temporal   SpO2: 99%   Weight: 175 lb (79.4 kg)   Height: 5' 8\" (1.727 m)           Exam:   HEENT- atraumatic,normocephalic, awake, oriented, well nourished  Neck - supple,no enlarged lymph nodes, no JVD, no thyromegaly  Chest- CTA, no rhonchi, no crackles  Heart- rrr, no murmurs / gallop/rub  Abdomen- soft,BS+,NT, no hepatosplenomegaly  Ext - no c/c/edema   Neuro- no focal deficits. Power 5/5 all extremities  Skin - warm,dry, no obvious rashes.           Review of Data:   LABS:   Lab Results   Component Value Date/Time    WBC 6.9 03/19/2021 08:41 AM    HGB 15.7 03/19/2021 08:41 AM    HCT 48.7 03/19/2021 08:41 AM    PLATELET 298 (L) 69/45/9577 08:41 AM     Lab Results   Component Value Date/Time    Sodium 139 06/18/2020 09:02 AM    Potassium 4.0 06/18/2020 09:02 AM    Chloride 104 06/18/2020 09:02 AM    CO2 24 06/18/2020 09:02 AM    Glucose 94 06/18/2020 09:02 AM    BUN 8 06/18/2020 09:02 AM    Creatinine 1.17 06/18/2020 09:02 AM     Lab Results   Component Value Date/Time    Cholesterol, total 186 06/18/2020 09:02 AM    HDL Cholesterol 45 06/18/2020 09:02 AM    LDL, calculated 103 (H) 06/18/2020 09:02 AM    Triglyceride 191 (H) 06/18/2020 09:02 AM     No components found for: GPT        Impression / Plan:        ICD-10-CM ICD-9-CM    1. B12 deficiency  E53.8 266.2 cyanocobalamin-cobamamide (B-12) 5,000-100 mcg sublingual tablet   2. Vitamin D deficiency  E55.9 268.9 cholecalciferol (VITAMIN D3) (2,000 UNITS /50 MCG) cap capsule   3. Acquired hypothyroidism  E03.9 244.9 levothyroxine (SYNTHROID) 75 mcg tablet   4. Pernicious anemia  D51.0 281.0 cyanocobalamin-cobamamide (B-12) 5,000-100 mcg sublingual tablet   5. Thrombocytopenia (HCC)  D69.6 287.5      Since patient has pernicious anemia, you may not have good absorption from the gut, will prescribe sublingual B12 to improve B12 levels. Patient does not want B12 injections at this point. Thrombocytopenia-could be secondary to B12 deficiency, may improve after B12 supplementation. Vitamin D deficiency-advised patient to take vitamin D regularly as directed, will increase the dose to 2000 units daily    Hypothyroidism-mild fatigue and weight gain, elevated TSH-we will increase the dose of levothyroxine to 75 mcg daily. Explained to patient risk benefits of the medications. Advised patient to stop meds if having any side effects. Pt verbalized understanding of the instructions. I have discussed the diagnosis with the patient and the intended plan as seen in the above orders. The patient has received an after-visit summary and questions were answered concerning future plans.   I have discussed medication side effects and warnings with the patient as well. I have reviewed the plan of care with the patient, accepted their input and they are in agreement with the treatment goals. Reviewed plan of care. Patient has provided input and agrees with goals. Follow-up and Dispositions    · Return in about 4 months (around 7/24/2021).          Jean Carlos Lara MD

## 2021-03-24 NOTE — PROGRESS NOTES
Chief Complaint   Patient presents with    Labs     1. Have you been to the ER, urgent care clinic since your last visit? Hospitalized since your last visit? No    2. Have you seen or consulted any other health care providers outside of the [address] [place] [address] since your last visit? Include any pap smears or colon screening.  No

## 2021-04-05 DIAGNOSIS — E53.8 B12 DEFICIENCY: ICD-10-CM

## 2021-04-05 DIAGNOSIS — E55.9 VITAMIN D DEFICIENCY: ICD-10-CM

## 2021-04-05 DIAGNOSIS — D51.0 PERNICIOUS ANEMIA: ICD-10-CM

## 2021-04-05 RX ORDER — CYANOCOBALAMIN (VITAMIN B-12) 2500 MCG
1 TABLET, SUBLINGUAL SUBLINGUAL DAILY
Qty: 90 TAB | Refills: 1 | Status: SHIPPED | OUTPATIENT
Start: 2021-04-05

## 2021-04-05 RX ORDER — ACETAMINOPHEN 500 MG
2000 TABLET ORAL 2 TIMES DAILY
Qty: 90 CAP | Refills: 1 | Status: SHIPPED | OUTPATIENT
Start: 2021-04-05 | End: 2021-04-07 | Stop reason: ALTCHOICE

## 2021-04-05 NOTE — TELEPHONE ENCOUNTER
Medications were sent a week ago, pharmacy has on file the B12 is not covered by insurance and would be $10.77. Would you want the patient to take over the counter.      Looks like the patient also sent you a mychart message

## 2021-04-07 DIAGNOSIS — E55.9 VITAMIN D DEFICIENCY: Primary | ICD-10-CM

## 2021-04-07 RX ORDER — MELATONIN
Qty: 180 TAB | Refills: 1 | Status: SHIPPED | OUTPATIENT
Start: 2021-04-07 | End: 2021-07-26 | Stop reason: ALTCHOICE

## 2021-05-03 ENCOUNTER — TELEPHONE (OUTPATIENT)
Dept: ONCOLOGY | Age: 51
End: 2021-05-03

## 2021-05-03 NOTE — TELEPHONE ENCOUNTER
Pt called and stated he wont be able to make it to his apt on 5/5/ as he will be out of town. He requested to have it reschedule sometime in June. Ok to cancel?     Cb# [phone]

## 2021-07-14 ENCOUNTER — TELEPHONE (OUTPATIENT)
Dept: PRIMARY CARE CLINIC | Age: 51
End: 2021-07-14

## 2021-07-14 DIAGNOSIS — E53.8 B12 DEFICIENCY: Primary | ICD-10-CM

## 2021-07-14 DIAGNOSIS — E03.9 ACQUIRED HYPOTHYROIDISM: ICD-10-CM

## 2021-07-14 DIAGNOSIS — D69.6 THROMBOCYTOPENIA (HCC): ICD-10-CM

## 2021-07-14 DIAGNOSIS — E55.9 VITAMIN D DEFICIENCY: ICD-10-CM

## 2021-07-14 NOTE — TELEPHONE ENCOUNTER
Needs lab orders placed       ----- Message from Oswald Boast sent at 7/14/2021 10:17 AM EDT -----  Regarding: Dr. Ireland/Telephone  General Message/Vendor Calls    Caller's first and last name: n/a      Reason for call: Wants blood work done before appt      Callback required yes/no and why: yes      Best contact number(s): [phone]      Details to clarify the request: n/a      Oswald Boast

## 2021-07-26 ENCOUNTER — OFFICE VISIT (OUTPATIENT)
Dept: PRIMARY CARE CLINIC | Age: 51
End: 2021-07-26
Payer: COMMERCIAL

## 2021-07-26 VITALS
BODY MASS INDEX: 26.73 KG/M2 | TEMPERATURE: 97.7 F | HEART RATE: 61 BPM | WEIGHT: 176.4 LBS | RESPIRATION RATE: 16 BRPM | HEIGHT: 68 IN | OXYGEN SATURATION: 100 % | SYSTOLIC BLOOD PRESSURE: 133 MMHG | DIASTOLIC BLOOD PRESSURE: 76 MMHG

## 2021-07-26 DIAGNOSIS — D69.6 THROMBOCYTOPENIA (HCC): ICD-10-CM

## 2021-07-26 DIAGNOSIS — L85.3 DRY SKIN: ICD-10-CM

## 2021-07-26 DIAGNOSIS — E03.9 ACQUIRED HYPOTHYROIDISM: ICD-10-CM

## 2021-07-26 DIAGNOSIS — E55.9 VITAMIN D DEFICIENCY: Primary | ICD-10-CM

## 2021-07-26 DIAGNOSIS — D51.0 PERNICIOUS ANEMIA: ICD-10-CM

## 2021-07-26 PROCEDURE — 99213 OFFICE O/P EST LOW 20 MIN: CPT | Performed by: INTERNAL MEDICINE

## 2021-07-26 RX ORDER — ERGOCALCIFEROL 1.25 MG/1
50000 CAPSULE ORAL
Qty: 12 CAPSULE | Refills: 1 | Status: SHIPPED | OUTPATIENT
Start: 2021-07-26 | End: 2022-01-09

## 2021-07-26 RX ORDER — LEVOTHYROXINE SODIUM 75 UG/1
75 TABLET ORAL
Qty: 90 TABLET | Refills: 1 | Status: SHIPPED | OUTPATIENT
Start: 2021-07-26 | End: 2021-10-07

## 2021-07-26 RX ORDER — AMMONIUM LACTATE 12 G/100G
CREAM TOPICAL 2 TIMES DAILY
Qty: 280 G | Refills: 1 | Status: SHIPPED | OUTPATIENT
Start: 2021-07-26 | End: 2022-01-17

## 2021-07-26 NOTE — PROGRESS NOTES
Chief Complaint   Patient presents with    Hypothyroidism    Vitamin D Deficiency    Vitamin B12 Deficiency        Visit Vitals  /76 (BP 1 Location: Left upper arm, BP Patient Position: Sitting)   Pulse 61   Temp 97.7 °F (36.5 °C) (Oral)   Resp 16   Ht 5' 8\" (1.727 m)   Wt 176 lb 6.4 oz (80 kg)   SpO2 100%   BMI 26.82 kg/m²        1. Have you been to the ER, urgent care clinic since your last visit? Hospitalized since your last visit? No    2. Have you seen or consulted any other health care providers outside of the [address] [place] [address] since your last visit? Include any pap smears or colon screening.  No

## 2021-07-26 NOTE — PROGRESS NOTES
Brayan Valdez is a 44 y.o.  male and presents with     Chief Complaint   Patient presents with    Hypothyroidism    Vitamin D Deficiency    Vitamin B12 Deficiency     Pt has been taking OTC vitamin B12 and it seems to be helping. His B12 levels are up. Pt takes vitamin D but reports it is not covered by his insurance. Platelet counst have improved . Denies any bleeding. Has appt with Hematology soon  NO chest pain ,SOB. Mild wt gain. He is working from home and not as active. No past medical history on file. No past surgical history on file. Current Outpatient Medications   Medication Sig    ergocalciferol (ERGOCALCIFEROL) 1,250 mcg (50,000 unit) capsule Take 1 Capsule by mouth every seven (7) days.  levothyroxine (SYNTHROID) 75 mcg tablet Take 1 Tablet by mouth Daily (before breakfast).  ammonium lactate (AMLACTIN) 12 % topical cream Apply  to affected area two (2) times a day. rub in to affected area well    cyanocobalamin-cobamamide (B-12) 5,000-100 mcg sublingual tablet 1 Tab by SubLINGual route daily.  thiamine HCL (Vitamin B-1) 100 mg tablet Take  by mouth daily. No current facility-administered medications for this visit. Health Maintenance   Topic Date Due    DTaP/Tdap/Td series (1 - Tdap) Never done    Colorectal Cancer Screening Combo  Never done    Shingrix Vaccine Age 50> (1 of 2) Never done    Flu Vaccine (1) 09/01/2021    Lipid Screen  06/18/2025    Hepatitis C Screening  Completed    COVID-19 Vaccine  Completed    Pneumococcal 0-64 years  Aged [employer] History   Administered Date(s) Administered    COVID-19, PFIZER, MRNA, LNP-S, PF, 30MCG/0.3ML DOSE 04/12/2021, 05/04/2021     No LMP for male patient. Allergies and Intolerances:   No Known Allergies    Family History:   Family History   Problem Relation Age of Onset    Diabetes Father        Social History:   He  reports that he has never smoked.  He has never used smokeless tobacco. He  reports no history of alcohol use. Review of Systems:   General: negative for - chills, fatigue, fever, weight change  Psych: negative for - anxiety, depression, irritability or mood swings  ENT: negative for - headaches, hearing change, nasal congestion, oral lesions, sneezing or sore throat  Heme/ Lymph: negative for - bleeding problems, bruising, pallor or swollen lymph nodes  Endo: negative for - hot flashes, polydipsia/polyuria or temperature intolerance  Resp: negative for - cough, shortness of breath or wheezing  CV: negative for - chest pain, edema or palpitations  GI: negative for - abdominal pain, change in bowel habits, constipation, diarrhea or nausea/vomiting  : negative for - dysuria, hematuria, incontinence, pelvic pain or vulvar/vaginal symptoms  MSK: negative for - joint pain, joint swelling or muscle pain  Neuro: negative for - confusion, headaches, seizures or weakness  Derm: negative for - dry skin, hair changes, rash or skin lesion changes          Physical:   Vitals:   Vitals:    07/26/21 0805   BP: 133/76   Pulse: 61   Resp: 16   Temp: 97.7 °F (36.5 °C)   TempSrc: Oral   SpO2: 100%   Weight: 176 lb 6.4 oz (80 kg)   Height: 5' 8\" (1.727 m)           Exam:   HEENT- atraumatic,normocephalic, awake, oriented, well nourished  Neck - supple,no enlarged lymph nodes, no JVD, no thyromegaly  Chest- CTA, no rhonchi, no crackles  Heart- rrr, no murmurs / gallop/rub  Abdomen- soft,BS+,NT, no hepatosplenomegaly  Ext - no c/c/edema   Neuro- no focal deficits. Power 5/5 all extremities  Skin - warm,dry, no obvious rashes.           Review of Data:   LABS:   Lab Results   Component Value Date/Time    WBC 9.3 07/19/2021 03:35 PM    HGB 15.5 07/19/2021 03:35 PM    HCT 47.1 07/19/2021 03:35 PM    PLATELET 434 (L) 34/28/8014 03:35 PM     Lab Results   Component Value Date/Time    Sodium 139 06/18/2020 09:02 AM    Potassium 4.0 06/18/2020 09:02 AM    Chloride 104 06/18/2020 09:02 AM    CO2 24 06/18/2020 09:02 AM    Glucose 94 06/18/2020 09:02 AM    BUN 8 06/18/2020 09:02 AM    Creatinine 1.17 06/18/2020 09:02 AM     Lab Results   Component Value Date/Time    Cholesterol, total 186 06/18/2020 09:02 AM    HDL Cholesterol 45 06/18/2020 09:02 AM    LDL, calculated 103 (H) 06/18/2020 09:02 AM    Triglyceride 191 (H) 06/18/2020 09:02 AM     No components found for: GPT        Impression / Plan:        ICD-10-CM ICD-9-CM    1. Vitamin D deficiency  E55.9 268.9    2. Pernicious anemia  D51.0 281.0    3. Acquired hypothyroidism  E03.9 244.9 levothyroxine (SYNTHROID) 75 mcg tablet   4. Thrombocytopenia (HCC)  D69.6 287.5    5. Dry skin  L85.3 701.1 ammonium lactate (AMLACTIN) 12 % topical cream         Explained to patient risk benefits of the medications. Advised patient to stop meds if having any side effects. Pt verbalized understanding of the instructions. I have discussed the diagnosis with the patient and the intended plan as seen in the above orders. The patient has received an after-visit summary and questions were answered concerning future plans. I have discussed medication side effects and warnings with the patient as well. I have reviewed the plan of care with the patient, accepted their input and they are in agreement with the treatment goals. Reviewed plan of care. Patient has provided input and agrees with goals. Follow-up and Dispositions    · Return in about 4 months (around 11/26/2021) for FULL PHYSICAL - 30 minutes.          Xi Conde MD

## 2021-07-29 ENCOUNTER — OFFICE VISIT (OUTPATIENT)
Dept: ONCOLOGY | Age: 51
End: 2021-07-29
Payer: COMMERCIAL

## 2021-07-29 VITALS
TEMPERATURE: 98.3 F | WEIGHT: 174 LBS | RESPIRATION RATE: 18 BRPM | HEART RATE: 55 BPM | BODY MASS INDEX: 26.46 KG/M2 | OXYGEN SATURATION: 98 % | DIASTOLIC BLOOD PRESSURE: 86 MMHG | SYSTOLIC BLOOD PRESSURE: 134 MMHG

## 2021-07-29 DIAGNOSIS — D69.6 THROMBOCYTOPENIA (HCC): ICD-10-CM

## 2021-07-29 DIAGNOSIS — E53.8 B12 DEFICIENCY: Primary | ICD-10-CM

## 2021-07-29 PROCEDURE — 99213 OFFICE O/P EST LOW 20 MIN: CPT | Performed by: INTERNAL MEDICINE

## 2021-07-29 NOTE — PROGRESS NOTES
[facility] [place] [place] [address] [address] [facility] [address]  [facility] [place]: [phone]  F: [phone]    Reason for Visit:   Austin Rader is a 44 y.o. male who is seen for follow up of  thrombocytopenia    History of Present Illness:   Patient is a 44 y.o. male seen for thrombocytopenia    He is healthy, has hypothyroidism and VD deficiency  States he has a h/o anemia and had been on iron supplements  He has has not been taking any OTC supplements. He was found to have b12 deficiency and started on po b12. He is taking it sublingually. No recurrence of rash  He feels well, has not had no neuropathy, no abdominal pain. He states that he had a normal colonoscopy in 2007    Sister has low platelets    PMH and PSH reviewed    Hypothyroidism    Social History     Tobacco Use    Smoking status: Never Smoker    Smokeless tobacco: Never Used   Substance Use Topics    Alcohol use: No      Family History   Problem Relation Age of Onset    Diabetes Father      Current Outpatient Medications   Medication Sig    levothyroxine (SYNTHROID) 75 mcg tablet Take 1 Tablet by mouth Daily (before breakfast).  thiamine HCL (Vitamin B-1) 100 mg tablet Take  by mouth daily.  ergocalciferol (ERGOCALCIFEROL) 1,250 mcg (50,000 unit) capsule Take 1 Capsule by mouth every seven (7) days. (Patient not taking: Reported on 7/29/2021)    ammonium lactate (AMLACTIN) 12 % topical cream Apply  to affected area two (2) times a day. rub in to affected area well (Patient not taking: Reported on 7/29/2021)    cyanocobalamin-cobamamide (B-12) 5,000-100 mcg sublingual tablet 1 Tab by SubLINGual route daily. (Patient not taking: Reported on 7/29/2021)     No current facility-administered medications for this visit. No Known Allergies     Review of Systems: A complete review of systems was obtained, negative except as described above.     Physical Exam:   Vitals reveiwed    Constitutional: Appears well-developed and well-nourished in no apparent distress   Mental status: Alert and awake, Oriented to person/place/time, Able to follow commands  Eyes: EOM normal, Sclera normal, No visible ocular discharge  Skin: No significant exanthematous lesions or discoloration noted on facial skin  GI: No splenomegaly      Results:     Lab Results   Component Value Date/Time    WBC 9.3 07/19/2021 03:35 PM    HGB 15.5 07/19/2021 03:35 PM    HCT 47.1 07/19/2021 03:35 PM    PLATELET 702 (L) 00/51/0975 03:35 PM    MCV 89.0 07/19/2021 03:35 PM    ABS. NEUTROPHILS 6.8 07/19/2021 03:35 PM     Lab Results   Component Value Date/Time    Sodium 139 06/18/2020 09:02 AM    Potassium 4.0 06/18/2020 09:02 AM    Chloride 104 06/18/2020 09:02 AM    CO2 24 06/18/2020 09:02 AM    Glucose 94 06/18/2020 09:02 AM    BUN 8 06/18/2020 09:02 AM    Creatinine 1.17 06/18/2020 09:02 AM    GFR est AA 83 06/18/2020 09:02 AM    GFR est non-AA 72 06/18/2020 09:02 AM    Calcium 9.3 06/18/2020 09:02 AM     Lab Results   Component Value Date/Time    Bilirubin, total 0.6 06/18/2020 09:02 AM    ALT (SGPT) 13 06/18/2020 09:02 AM    Alk. phosphatase 103 06/18/2020 09:02 AM    Protein, total 7.0 06/18/2020 09:02 AM    Albumin 4.3 06/18/2020 09:02 AM     Lab Results   Component Value Date/Time    Vitamin B12 1,683 (H) 07/19/2021 03:36 PM    Folate 5.5 07/19/2021 03:35 PM    TSH 0.50 07/19/2021 03:35 PM    Hep C Virus Ab <0.1 08/26/2020 08:55 AM    HIV SCREEN 4TH GENERATION WRFX Non Reactive 08/26/2020 08:55 AM     No results found for: INR, APTT, DDIMSQ, DDIME, [id number], Benjamín Coffee, FDPLT, Kimberly Hind, [address] [facility], [address] [facility], [id number], [facility] [address], [id number], [id number], [id number], [id number], [facility] 11/2020  Platelets 753B,     SMEAR in citrate tube  Actual platelet count may be somewhat higher than reported due to   aggregation of platelets in this sample.    White count and platelets may be decreased due to fibrin strands   in the sample submitted. Records reviewed and summarized above. Pathology report(s) reviewed above. Radiology report(s) reviewed above. USG Abdomen 8/2020  IMPRESSION  IMPRESSION:   1. Increased hepatic echotexture is suggestive but not diagnostic for hepatic  steatosis. No focal liver lesion or biliary abnormality. 2.  Normal sized spleen. Assessment:   1) Mild thrombocytopenia    Secondary to B12 deficiency and pseudothrombocytopenia  An immune component is not ruled out  Stable counts    2) Hypothyroidism    3) VD defiicency    4) Vitamin B12 deficiency/ pernicious anemia  Though B12 absorption is impaired in pernicious anemia high doses of oral B12 are often efficacious in replacement. He is not keen on IM replacement and is replete on po replacement  Fine to continue 0136-8760 mcg po daily or alternate day sublingually  I strongly advice he gets an EGD      5) Hepatic steatosis        Plan:     B12- 1000 mcg po daily- sublingual is fine too  Folic acid daily  Consider GI referral  RTC 1 year with labs and if stable can then follow with Dr. Beth Vela    I appreciate the opportunity to participate in Mr. Alireza Dotson [facility].     Signed By: Summer Santamaria MD

## 2021-07-29 NOTE — PROGRESS NOTES
Jas Bernstein is a 44 y.o. male    Chief Complaint   Patient presents with    Follow-up     thrombocytopenia       1. Have you been to the ER, urgent care clinic since your last visit? Hospitalized since your last visit? No    2. Have you seen or consulted any other health care providers outside of the [address] [place] [address] since your last visit? Include any pap smears or colon screening. Yes PCP

## 2021-10-07 DIAGNOSIS — E03.9 ACQUIRED HYPOTHYROIDISM: ICD-10-CM

## 2021-10-07 RX ORDER — LEVOTHYROXINE SODIUM 75 UG/1
TABLET ORAL
Qty: 90 TABLET | Refills: 0 | Status: SHIPPED | OUTPATIENT
Start: 2021-10-07 | End: 2022-01-09

## 2021-11-04 ENCOUNTER — TELEPHONE (OUTPATIENT)
Dept: PRIMARY CARE CLINIC | Age: 51
End: 2021-11-04

## 2021-11-04 NOTE — TELEPHONE ENCOUNTER
----- Message from Vivek Godoy sent at 11/3/2021  1:51 PM EDT -----  Subject: Message to Provider    QUESTIONS  Information for Provider? patient needs additional things looked at for   his lab work call patient back to get the details   ---------------------------------------------------------------------------  --------------  [address]  What is the best way for the office to contact you? OK to leave message on   voicemail  Preferred Call Back Phone Number? [phone]  ---------------------------------------------------------------------------  --------------  SCRIPT ANSWERS  Relationship to Patient?  Self unable to assess due to poor participation/comprehension

## 2021-11-05 DIAGNOSIS — Z00.00 ANNUAL PHYSICAL EXAM: Primary | ICD-10-CM

## 2021-11-05 DIAGNOSIS — E03.9 ACQUIRED HYPOTHYROIDISM: ICD-10-CM

## 2021-11-05 DIAGNOSIS — E53.8 B12 DEFICIENCY: ICD-10-CM

## 2021-11-05 DIAGNOSIS — E55.9 VITAMIN D DEFICIENCY: ICD-10-CM

## 2021-11-08 ENCOUNTER — PATIENT MESSAGE (OUTPATIENT)
Dept: PRIMARY CARE CLINIC | Age: 51
End: 2021-11-08

## 2021-11-11 ENCOUNTER — CLINICAL SUPPORT (OUTPATIENT)
Dept: PRIMARY CARE CLINIC | Age: 51
End: 2021-11-11
Payer: COMMERCIAL

## 2021-11-11 DIAGNOSIS — Z23 ENCOUNTER FOR IMMUNIZATION: ICD-10-CM

## 2021-11-11 DIAGNOSIS — L53.9 INJECTION (ERYTHEMA): Primary | ICD-10-CM

## 2021-11-11 PROCEDURE — 90471 IMMUNIZATION ADMIN: CPT | Performed by: INTERNAL MEDICINE

## 2021-11-11 PROCEDURE — 90686 IIV4 VACC NO PRSV 0.5 ML IM: CPT | Performed by: INTERNAL MEDICINE

## 2022-01-09 DIAGNOSIS — E03.9 ACQUIRED HYPOTHYROIDISM: ICD-10-CM

## 2022-01-09 RX ORDER — LEVOTHYROXINE SODIUM 75 UG/1
TABLET ORAL
Qty: 90 TABLET | Refills: 0 | Status: SHIPPED | OUTPATIENT
Start: 2022-01-09 | End: 2022-04-12

## 2022-01-09 RX ORDER — ERGOCALCIFEROL 1.25 MG/1
CAPSULE ORAL
Qty: 12 CAPSULE | Refills: 0 | Status: SHIPPED | OUTPATIENT
Start: 2022-01-09 | End: 2022-01-17 | Stop reason: DRUGHIGH

## 2022-01-17 ENCOUNTER — OFFICE VISIT (OUTPATIENT)
Dept: PRIMARY CARE CLINIC | Age: 52
End: 2022-01-17
Payer: COMMERCIAL

## 2022-01-17 VITALS
SYSTOLIC BLOOD PRESSURE: 112 MMHG | DIASTOLIC BLOOD PRESSURE: 70 MMHG | TEMPERATURE: 97.6 F | WEIGHT: 175.4 LBS | OXYGEN SATURATION: 99 % | HEIGHT: 68 IN | BODY MASS INDEX: 26.58 KG/M2 | RESPIRATION RATE: 18 BRPM | HEART RATE: 61 BPM

## 2022-01-17 DIAGNOSIS — Z00.00 ANNUAL PHYSICAL EXAM: Primary | ICD-10-CM

## 2022-01-17 DIAGNOSIS — Z12.5 PROSTATE CANCER SCREENING: ICD-10-CM

## 2022-01-17 DIAGNOSIS — Z23 ENCOUNTER FOR IMMUNIZATION: ICD-10-CM

## 2022-01-17 DIAGNOSIS — E03.9 ACQUIRED HYPOTHYROIDISM: ICD-10-CM

## 2022-01-17 DIAGNOSIS — R74.8 ELEVATED ALKALINE PHOSPHATASE LEVEL: ICD-10-CM

## 2022-01-17 DIAGNOSIS — E78.5 HYPERLIPIDEMIA, UNSPECIFIED HYPERLIPIDEMIA TYPE: ICD-10-CM

## 2022-01-17 DIAGNOSIS — D69.6 THROMBOCYTOPENIA (HCC): ICD-10-CM

## 2022-01-17 DIAGNOSIS — E55.9 VITAMIN D DEFICIENCY: ICD-10-CM

## 2022-01-17 PROCEDURE — 90715 TDAP VACCINE 7 YRS/> IM: CPT | Performed by: INTERNAL MEDICINE

## 2022-01-17 PROCEDURE — 90471 IMMUNIZATION ADMIN: CPT | Performed by: INTERNAL MEDICINE

## 2022-01-17 PROCEDURE — 99396 PREV VISIT EST AGE 40-64: CPT | Performed by: INTERNAL MEDICINE

## 2022-01-17 RX ORDER — GEMFIBROZIL 600 MG/1
600 TABLET, FILM COATED ORAL 2 TIMES DAILY
Qty: 180 TABLET | Refills: 1 | Status: SHIPPED | OUTPATIENT
Start: 2022-01-17 | End: 2022-10-13 | Stop reason: SDUPTHER

## 2022-01-17 RX ORDER — MELATONIN
1000 DAILY
Qty: 90 TABLET | Refills: 2 | Status: SHIPPED | OUTPATIENT
Start: 2022-01-17 | End: 2022-10-13 | Stop reason: SDUPTHER

## 2022-01-17 RX ORDER — ZOSTER VACCINE RECOMBINANT, ADJUVANTED 50 MCG/0.5
0.5 KIT INTRAMUSCULAR ONCE
Qty: 0.5 ML | Refills: 0 | Status: SHIPPED | OUTPATIENT
Start: 2022-01-17 | End: 2022-01-17

## 2022-01-17 NOTE — PROGRESS NOTES
Britney Banuelos is a 46 y.o.  male and presents with     Chief Complaint   Patient presents with    Physical       Pt is here for a physical.  He had a colonoscopy few years ago. He is taking sublingual vitamin B12. Platelet counts have improved. Triglycerides are elevated. Vitamin D levels have improved. Denies any chest pain or shortness of breath. He is taking thyroid medications as directed. History reviewed. No pertinent past medical history. History reviewed. No pertinent surgical history. Current Outpatient Medications   Medication Sig    gemfibroziL (LOPID) 600 mg tablet Take 1 Tablet by mouth two (2) times a day.  cholecalciferol (VITAMIN D3) (1000 Units /25 mcg) tablet Take 1 Tablet by mouth daily.  varicella-zoster recombinant, PF, (Shingrix, PF,) 50 mcg/0.5 mL susr injection 0.5 mL by IntraMUSCular route once for 1 dose.  Euthyrox 75 mcg tablet TAKE 1 TABLET BY MOUTH ONCE DAILY BEFORE BREAKFAST    cyanocobalamin-cobamamide (B-12) 5,000-100 mcg sublingual tablet 1 Tab by SubLINGual route daily. No current facility-administered medications for this visit. Health Maintenance   Topic Date Due    Shingrix Vaccine Age 49> (1 of 2) Never done    Lipid Screen  01/11/2027    Colorectal Cancer Screening Combo  01/03/2029    DTaP/Tdap/Td series (2 - Td or Tdap) 01/17/2032    Hepatitis C Screening  Completed    Flu Vaccine  Completed    COVID-19 Vaccine  Completed    Pneumococcal 0-64 years  Aged [employer] History   Administered Date(s) Administered    COVID-19, PFIZER, MRNA, LNP-S, PF, 30MCG/0.3ML DOSE 04/12/2021, 05/04/2021, 12/29/2021    Influenza Vaccine 11/11/2021    Influenza Vaccine (Quad) PF (>6 Mo Flulaval, Fluarix, and >3 Yrs [place], Fluzone 07978) 11/11/2021    Tdap 01/17/2022     No LMP for male patient.         Allergies and Intolerances:   No Known Allergies    Family History:   Family History   Problem Relation Age of Onset    Diabetes Father        Social History:   He  reports that he has never smoked. He has never used smokeless tobacco.  He  reports no history of alcohol use. Review of Systems:   General: negative for - chills, fatigue, fever, weight change  Psych: negative for - anxiety, depression, irritability or mood swings  ENT: negative for - headaches, hearing change, nasal congestion, oral lesions, sneezing or sore throat  Heme/ Lymph: negative for - bleeding problems, bruising, pallor or swollen lymph nodes  Endo: negative for - hot flashes, polydipsia/polyuria or temperature intolerance  Resp: negative for - cough, shortness of breath or wheezing  CV: negative for - chest pain, edema or palpitations  GI: negative for - abdominal pain, change in bowel habits, constipation, diarrhea or nausea/vomiting  : negative for - dysuria, hematuria, incontinence, pelvic pain or vulvar/vaginal symptoms  MSK: negative for - joint pain, joint swelling or muscle pain  Neuro: negative for - confusion, headaches, seizures or weakness  Derm: negative for - dry skin, hair changes, rash or skin lesion changes          Physical:   Vitals:   Vitals:    01/17/22 1340   BP: 112/70   Pulse: 61   Resp: 18   Temp: 97.6 °F (36.4 °C)   TempSrc: Temporal   SpO2: 99%   Weight: 175 lb 6.4 oz (79.6 kg)   Height: 5' 8\" (1.727 m)           Exam:   HEENT- atraumatic,normocephalic, awake, oriented, well nourished  Neck - supple,no enlarged lymph nodes, no JVD, no thyromegaly  Chest- CTA, no rhonchi, no crackles  Heart- rrr, no murmurs / gallop/rub  Abdomen- soft,BS+,NT, no hepatosplenomegaly  Ext - no c/c/edema   Neuro- no focal deficits. Power 5/5 all extremities  Skin - warm,dry, no obvious rashes.           Review of Data:   LABS:   Lab Results   Component Value Date/Time    WBC 6.7 01/11/2022 08:11 AM    HGB 16.3 01/11/2022 08:11 AM    HCT 49.1 01/11/2022 08:11 AM    PLATELET 379 (L) 90/85/9205 08:11 AM     Lab Results   Component Value Date/Time    Sodium 139 01/11/2022 08:11 AM    Potassium 4.4 01/11/2022 08:11 AM    Chloride 108 01/11/2022 08:11 AM    CO2 26 01/11/2022 08:11 AM    Glucose 97 01/11/2022 08:11 AM    BUN 13 01/11/2022 08:11 AM    Creatinine 1.22 01/11/2022 08:11 AM     Lab Results   Component Value Date/Time    Cholesterol, total 192 01/11/2022 08:11 AM    HDL Cholesterol 50 01/11/2022 08:11 AM    LDL, calculated 103.2 (H) 01/11/2022 08:11 AM    Triglyceride 194 (H) 01/11/2022 08:11 AM     No components found for: GPT        Impression / Plan:        ICD-10-CM ICD-9-CM    1. Annual physical exam  Z00.00 V70.0    2. Hyperlipidemia, unspecified hyperlipidemia type  E78.5 272.4 gemfibroziL (LOPID) 600 mg tablet      LIPID PANEL   3. Vitamin D deficiency  E55.9 268.9 cholecalciferol (VITAMIN D3) (1000 Units /25 mcg) tablet   4. Thrombocytopenia (HCC)  V15.5 427.1 METABOLIC PANEL, COMPREHENSIVE   5. Acquired hypothyroidism  E03.9 244.9    6. Elevated alkaline phosphatase level  O08.9 519.1 METABOLIC PANEL, COMPREHENSIVE   7. Encounter for immunization  Z23 V03.89 varicella-zoster recombinant, PF, (Shingrix, PF,) 50 mcg/0.5 mL susr injection      TETANUS, DIPHTHERIA TOXOIDS AND ACELLULAR PERTUSSIS VACCINE (TDAP), IN INDIVIDS. >=7, IM   8. Prostate cancer screening  Z12.5 V76.44 PSA, DIAGNOSTIC (PROSTATE SPECIFIC AG)     Vitamin B12 levels have improved. Vitamin D levels have improved, asked patient to switch to vitamin D 1000 units daily. Platelet counts have improved. Explained to patient risk benefits of the medications. Advised patient to stop meds if having any side effects. Pt verbalized understanding of the instructions. I have discussed the diagnosis with the patient and the intended plan as seen in the above orders. The patient has received an after-visit summary and questions were answered concerning future plans. I have discussed medication side effects and warnings with the patient as well.  I have reviewed the plan of care with the patient, accepted their input and they are in agreement with the treatment goals. Reviewed plan of care. Patient has provided input and agrees with goals. Follow-up and Dispositions    · Return in about 7 months (around 8/17/2022).          Barron Sosa MD

## 2022-01-17 NOTE — PROGRESS NOTES
Identified pt with two pt identifiers(name and ). Chief Complaint   Patient presents with    Physical        3 most recent PHQ Screens 2022   Little interest or pleasure in doing things Not at all   Feeling down, depressed, irritable, or hopeless Not at all   Total Score PHQ 2 0        Vitals:    22 1340   BP: 112/70   Pulse: 61   Resp: 18   Temp: 97.6 °F (36.4 °C)   TempSrc: Temporal   SpO2: 99%   Weight: 175 lb 6.4 oz (79.6 kg)   Height: 5' 8\" (1.727 m)   PainSc:   0 - No pain       Health Maintenance Due   Topic    DTaP/Tdap/Td series (1 - Tdap)    Colorectal Cancer Screening Combo     Shingrix Vaccine Age 50> (1 of 2)       1. Have you been to the ER, urgent care clinic since your last visit? Hospitalized since your last visit? No    2. Have you seen or consulted any other health care providers outside of the [address] [place] [address] since your last visit? Include any pap smears or colon screening.  No

## 2022-01-18 ENCOUNTER — PATIENT MESSAGE (OUTPATIENT)
Dept: PRIMARY CARE CLINIC | Age: 52
End: 2022-01-18

## 2022-01-20 NOTE — TELEPHONE ENCOUNTER
Message from Dr Vianey Rushing    Thank you for the message. Last colonoscopy was on 10/8/2020 and showed moderate internal hemorrhoids with rest of the colon normal. Recommendation was for repeat colonoscopy in 10 years. I have sent a message to my assistant to fax the report to your office.

## 2022-03-18 PROBLEM — D69.6 THROMBOCYTOPENIA (HCC): Status: ACTIVE | Noted: 2020-08-06

## 2022-03-18 PROBLEM — E03.9 HYPOTHYROIDISM: Status: ACTIVE | Noted: 2020-08-06

## 2022-03-19 PROBLEM — E53.8 B12 DEFICIENCY: Status: ACTIVE | Noted: 2020-09-09

## 2022-04-12 DIAGNOSIS — E03.9 ACQUIRED HYPOTHYROIDISM: ICD-10-CM

## 2022-04-12 RX ORDER — LEVOTHYROXINE SODIUM 75 UG/1
TABLET ORAL
Qty: 90 TABLET | Refills: 0 | Status: SHIPPED | OUTPATIENT
Start: 2022-04-12 | End: 2022-07-18

## 2022-07-15 DIAGNOSIS — E03.9 ACQUIRED HYPOTHYROIDISM: ICD-10-CM

## 2022-07-18 RX ORDER — LEVOTHYROXINE SODIUM 75 UG/1
TABLET ORAL
Qty: 90 TABLET | Refills: 0 | Status: SHIPPED | OUTPATIENT
Start: 2022-07-18 | End: 2022-08-30 | Stop reason: SDUPTHER

## 2022-08-30 ENCOUNTER — OFFICE VISIT (OUTPATIENT)
Dept: PRIMARY CARE CLINIC | Age: 52
End: 2022-08-30
Payer: COMMERCIAL

## 2022-08-30 VITALS
HEART RATE: 61 BPM | TEMPERATURE: 98.3 F | RESPIRATION RATE: 18 BRPM | BODY MASS INDEX: 27.58 KG/M2 | SYSTOLIC BLOOD PRESSURE: 113 MMHG | DIASTOLIC BLOOD PRESSURE: 70 MMHG | HEIGHT: 68 IN | WEIGHT: 182 LBS | OXYGEN SATURATION: 98 %

## 2022-08-30 DIAGNOSIS — K21.9 GASTROESOPHAGEAL REFLUX DISEASE WITHOUT ESOPHAGITIS: ICD-10-CM

## 2022-08-30 DIAGNOSIS — R05.9 COUGH: Primary | ICD-10-CM

## 2022-08-30 DIAGNOSIS — J30.1 SEASONAL ALLERGIC RHINITIS DUE TO POLLEN: ICD-10-CM

## 2022-08-30 DIAGNOSIS — E03.9 ACQUIRED HYPOTHYROIDISM: ICD-10-CM

## 2022-08-30 PROCEDURE — 99213 OFFICE O/P EST LOW 20 MIN: CPT | Performed by: INTERNAL MEDICINE

## 2022-08-30 RX ORDER — BENZONATATE 100 MG/1
100 CAPSULE ORAL
Qty: 30 CAPSULE | Refills: 0 | Status: SHIPPED | OUTPATIENT
Start: 2022-08-30 | End: 2022-09-09

## 2022-08-30 RX ORDER — LORATADINE 10 MG/1
10 TABLET ORAL DAILY
Qty: 90 TABLET | Refills: 1 | Status: SHIPPED | OUTPATIENT
Start: 2022-08-30 | End: 2022-10-13 | Stop reason: SDUPTHER

## 2022-08-30 RX ORDER — LEVOTHYROXINE SODIUM 75 UG/1
75 TABLET ORAL
Qty: 90 TABLET | Refills: 1 | Status: SHIPPED | OUTPATIENT
Start: 2022-08-30 | End: 2022-10-10 | Stop reason: SDUPTHER

## 2022-08-30 RX ORDER — PANTOPRAZOLE SODIUM 40 MG/1
40 TABLET, DELAYED RELEASE ORAL DAILY
Qty: 90 TABLET | Refills: 1 | Status: SHIPPED | OUTPATIENT
Start: 2022-08-30 | End: 2022-10-13 | Stop reason: SDUPTHER

## 2022-08-30 NOTE — PROGRESS NOTES
Chief Complaint   Patient presents with    Post-COVID Symptoms     COVID Positive August 18, 2022 in [facility]   Patient here for cough        Visit Vitals  /70 (BP 1 Location: Left upper arm, BP Patient Position: Sitting)   Pulse 61   Temp 98.3 °F (36.8 °C) (Oral)   Resp 18   Ht 5' 8\" (1.727 m)   Wt 182 lb (82.6 kg)   SpO2 98%   BMI 27.67 kg/m²        Health Maintenance Due   Topic    Shingrix Vaccine Age 50> (1 of 2)    COVID-19 Vaccine (4 - Booster for [employer] series)        1. Have you been to the ER, urgent care clinic since your last visit? Hospitalized since your last visit? No    2. Have you seen or consulted any other health care providers outside of the [address] [place] [address] since your last visit? Include any pap smears or colon screening.  No

## 2022-10-10 DIAGNOSIS — L85.3 DRY SKIN: ICD-10-CM

## 2022-10-10 DIAGNOSIS — E03.9 ACQUIRED HYPOTHYROIDISM: ICD-10-CM

## 2022-10-10 RX ORDER — AMMONIUM LACTATE 12 G/100G
CREAM TOPICAL 2 TIMES DAILY
Qty: 280 G | Refills: 1 | Status: SHIPPED | OUTPATIENT
Start: 2022-10-10 | End: 2022-10-13 | Stop reason: SDUPTHER

## 2022-10-10 RX ORDER — LEVOTHYROXINE SODIUM 75 UG/1
75 TABLET ORAL
Qty: 90 TABLET | Refills: 1 | Status: SHIPPED | OUTPATIENT
Start: 2022-10-10 | End: 2022-10-13 | Stop reason: SDUPTHER

## 2022-10-13 ENCOUNTER — OFFICE VISIT (OUTPATIENT)
Dept: PRIMARY CARE CLINIC | Age: 52
End: 2022-10-13
Payer: COMMERCIAL

## 2022-10-13 VITALS
DIASTOLIC BLOOD PRESSURE: 72 MMHG | BODY MASS INDEX: 27.74 KG/M2 | WEIGHT: 183 LBS | HEART RATE: 65 BPM | OXYGEN SATURATION: 97 % | TEMPERATURE: 97 F | RESPIRATION RATE: 18 BRPM | HEIGHT: 68 IN | SYSTOLIC BLOOD PRESSURE: 123 MMHG

## 2022-10-13 DIAGNOSIS — J30.1 SEASONAL ALLERGIC RHINITIS DUE TO POLLEN: ICD-10-CM

## 2022-10-13 DIAGNOSIS — E03.9 ACQUIRED HYPOTHYROIDISM: ICD-10-CM

## 2022-10-13 DIAGNOSIS — E55.9 VITAMIN D DEFICIENCY: ICD-10-CM

## 2022-10-13 DIAGNOSIS — K21.9 GASTROESOPHAGEAL REFLUX DISEASE WITHOUT ESOPHAGITIS: ICD-10-CM

## 2022-10-13 DIAGNOSIS — L85.3 DRY SKIN: ICD-10-CM

## 2022-10-13 DIAGNOSIS — M25.521 RIGHT ELBOW PAIN: Primary | ICD-10-CM

## 2022-10-13 DIAGNOSIS — E78.5 HYPERLIPIDEMIA, UNSPECIFIED HYPERLIPIDEMIA TYPE: ICD-10-CM

## 2022-10-13 PROCEDURE — 99213 OFFICE O/P EST LOW 20 MIN: CPT | Performed by: INTERNAL MEDICINE

## 2022-10-13 RX ORDER — MELATONIN
1000 DAILY
Qty: 90 TABLET | Refills: 2 | Status: SHIPPED | OUTPATIENT
Start: 2022-10-13 | End: 2022-10-13 | Stop reason: SDUPTHER

## 2022-10-13 RX ORDER — IBUPROFEN 400 MG/1
400 TABLET ORAL
Qty: 30 TABLET | Refills: 0 | Status: SHIPPED | OUTPATIENT
Start: 2022-10-13

## 2022-10-13 RX ORDER — AMMONIUM LACTATE 12 G/100G
CREAM TOPICAL 2 TIMES DAILY
Qty: 280 G | Refills: 1 | Status: SHIPPED | OUTPATIENT
Start: 2022-10-13 | End: 2022-10-13 | Stop reason: SDUPTHER

## 2022-10-13 RX ORDER — PANTOPRAZOLE SODIUM 40 MG/1
40 TABLET, DELAYED RELEASE ORAL DAILY
Qty: 90 TABLET | Refills: 1 | Status: SHIPPED | OUTPATIENT
Start: 2022-10-13

## 2022-10-13 RX ORDER — LEVOTHYROXINE SODIUM 75 UG/1
75 TABLET ORAL
Qty: 90 TABLET | Refills: 1 | Status: SHIPPED | OUTPATIENT
Start: 2022-10-13

## 2022-10-13 RX ORDER — AMMONIUM LACTATE 12 G/100G
CREAM TOPICAL 2 TIMES DAILY
Qty: 280 G | Refills: 1 | Status: SHIPPED | OUTPATIENT
Start: 2022-10-13

## 2022-10-13 RX ORDER — GEMFIBROZIL 600 MG/1
600 TABLET, FILM COATED ORAL 2 TIMES DAILY
Qty: 180 TABLET | Refills: 1 | Status: SHIPPED | OUTPATIENT
Start: 2022-10-13 | End: 2022-10-13 | Stop reason: SDUPTHER

## 2022-10-13 RX ORDER — IBUPROFEN 400 MG/1
400 TABLET ORAL
Qty: 30 TABLET | Refills: 0 | Status: SHIPPED | OUTPATIENT
Start: 2022-10-13 | End: 2022-10-13 | Stop reason: SDUPTHER

## 2022-10-13 RX ORDER — MELATONIN
1000 DAILY
Qty: 90 TABLET | Refills: 2 | Status: SHIPPED | OUTPATIENT
Start: 2022-10-13

## 2022-10-13 RX ORDER — LEVOTHYROXINE SODIUM 75 UG/1
75 TABLET ORAL
Qty: 90 TABLET | Refills: 1 | Status: SHIPPED | OUTPATIENT
Start: 2022-10-13 | End: 2022-10-13 | Stop reason: SDUPTHER

## 2022-10-13 RX ORDER — GEMFIBROZIL 600 MG/1
600 TABLET, FILM COATED ORAL 2 TIMES DAILY
Qty: 180 TABLET | Refills: 1 | Status: SHIPPED | OUTPATIENT
Start: 2022-10-13

## 2022-10-13 RX ORDER — PANTOPRAZOLE SODIUM 40 MG/1
40 TABLET, DELAYED RELEASE ORAL DAILY
Qty: 90 TABLET | Refills: 1 | Status: SHIPPED | OUTPATIENT
Start: 2022-10-13 | End: 2022-10-13 | Stop reason: SDUPTHER

## 2022-10-13 RX ORDER — LORATADINE 10 MG/1
10 TABLET ORAL DAILY
Qty: 90 TABLET | Refills: 1 | Status: SHIPPED | OUTPATIENT
Start: 2022-10-13

## 2022-10-13 RX ORDER — LORATADINE 10 MG/1
10 TABLET ORAL DAILY
Qty: 90 TABLET | Refills: 1 | Status: SHIPPED | OUTPATIENT
Start: 2022-10-13 | End: 2022-10-13 | Stop reason: SDUPTHER

## 2022-10-13 NOTE — PROGRESS NOTES
Aleja Culver is a 45 y.o.  male and presents with     Chief Complaint   Patient presents with    Elbow Pain     Follow up on right elbow pain      Pt was working in his yard and hit something. He has been having pain at the lateral aspect of rt elbow since then. He noticed  some bluish discoloration that seems to be fading away. No past medical history on file. No past surgical history on file. Current Outpatient Medications   Medication Sig    ibuprofen (MOTRIN) 400 mg tablet Take 1 Tablet by mouth every eight (8) hours as needed for Pain.    levothyroxine (SYNTHROID) 75 mcg tablet Take 1 Tablet by mouth Daily (before breakfast). ammonium lactate (AMLACTIN) 12 % topical cream Apply  to affected area two (2) times a day. rub in to affected area well    loratadine (Claritin) 10 mg tablet Take 1 Tablet by mouth daily. pantoprazole (PROTONIX) 40 mg tablet Take 1 Tablet by mouth daily. gemfibroziL (LOPID) 600 mg tablet Take 1 Tablet by mouth two (2) times a day. cholecalciferol (VITAMIN D3) (1000 Units /25 mcg) tablet Take 1 Tablet by mouth daily. cyanocobalamin-cobamamide (B-12) 5,000-100 mcg sublingual tablet 1 Tab by SubLINGual route daily. No current facility-administered medications for this visit.      Health Maintenance   Topic Date Due    Shingrix Vaccine Age 49> (1 of 2) Never done    COVID-19 Vaccine (4 - Booster for Pfizer series) 04/29/2022    Flu Vaccine (1) 08/01/2022    Depression Screen  01/17/2023    Lipid Screen  01/11/2027    Colorectal Cancer Screening Combo  01/03/2029    DTaP/Tdap/Td series (2 - Td or Tdap) 01/17/2032    Hepatitis C Screening  Completed    Pneumococcal 0-64 years  Aged [employer] History   Administered Date(s) Administered    COVID-19, PFIZER PURPLE top, DILUTE for use, (age 15 y+), IM, 30mcg/0.3mL 04/12/2021, 05/04/2021, 12/29/2021    Influenza Vaccine 11/11/2021    Influenza, FLUARIX, FLULAVAL, FLUZONE (age 10 mo+) AND AFLURIA, (age 1 y+), PF, 0.5mL 11/11/2021    Tdap 01/17/2022     No LMP for male patient. Allergies and Intolerances:   No Known Allergies    Family History:   Family History   Problem Relation Age of Onset    Diabetes Father        Social History:   He  reports that he has never smoked. He has never used smokeless tobacco.  He  reports no history of alcohol use. Review of Systems:   General: negative for - chills, fatigue, fever, weight change  Psych: negative for - anxiety, depression, irritability or mood swings  ENT: negative for - headaches, hearing change, nasal congestion, oral lesions, sneezing or sore throat  Heme/ Lymph: negative for - bleeding problems, bruising, pallor or swollen lymph nodes  Endo: negative for - hot flashes, polydipsia/polyuria or temperature intolerance  Resp: negative for - cough, shortness of breath or wheezing  CV: negative for - chest pain, edema or palpitations  GI: negative for - abdominal pain, change in bowel habits, constipation, diarrhea or nausea/vomiting  : negative for - dysuria, hematuria, incontinence, pelvic pain or vulvar/vaginal symptoms  MSK: negative for - joint pain, joint swelling or muscle pain  Neuro: negative for - confusion, headaches, seizures or weakness  Derm: negative for - dry skin, hair changes, rash or skin lesion changes          Physical:   Vitals:   Vitals:    10/13/22 0823   BP: 123/72   Pulse: 65   Resp: 18   Temp: 97 °F (36.1 °C)   TempSrc: Temporal   SpO2: 97%   Weight: 183 lb (83 kg)   Height: 5' 8\" (1.727 m)           Exam:   HEENT- atraumatic,normocephalic, awake, oriented, well nourished  Neck - supple,no enlarged lymph nodes, no JVD, no thyromegaly  Chest- CTA, no rhonchi, no crackles  Heart- rrr, no murmurs / gallop/rub  Abdomen- soft,BS+,NT, no hepatosplenomegaly  Ext - no c/c/edema   Neuro- no focal deficits. Power 5/5 all extremities  Skin - warm,dry, no obvious rashes.           Review of Data:   LABS:   Lab Results   Component Value Date/Time    WBC 6.7 01/11/2022 08:11 AM    HGB 16.3 01/11/2022 08:11 AM    HCT 49.1 01/11/2022 08:11 AM    PLATELET 892 (L) 64/86/8398 08:11 AM     Lab Results   Component Value Date/Time    Sodium 139 01/11/2022 08:11 AM    Potassium 4.4 01/11/2022 08:11 AM    Chloride 108 01/11/2022 08:11 AM    CO2 26 01/11/2022 08:11 AM    Glucose 97 01/11/2022 08:11 AM    BUN 13 01/11/2022 08:11 AM    Creatinine 1.22 01/11/2022 08:11 AM     Lab Results   Component Value Date/Time    Cholesterol, total 192 01/11/2022 08:11 AM    HDL Cholesterol 50 01/11/2022 08:11 AM    LDL, calculated 103.2 (H) 01/11/2022 08:11 AM    Triglyceride 194 (H) 01/11/2022 08:11 AM     No components found for: GPT        Impression / Plan:        ICD-10-CM ICD-9-CM    1. Right elbow pain  M25.521 719.42 ibuprofen (MOTRIN) 400 mg tablet      DISCONTINUED: ibuprofen (MOTRIN) 400 mg tablet      2. Acquired hypothyroidism  E03.9 244.9 levothyroxine (SYNTHROID) 75 mcg tablet      DISCONTINUED: levothyroxine (SYNTHROID) 75 mcg tablet      3. Dry skin  L85.3 701.1 ammonium lactate (AMLACTIN) 12 % topical cream      DISCONTINUED: ammonium lactate (AMLACTIN) 12 % topical cream      4. Seasonal allergic rhinitis due to pollen  J30.1 477.0 loratadine (Claritin) 10 mg tablet      DISCONTINUED: loratadine (Claritin) 10 mg tablet      5. Gastroesophageal reflux disease without esophagitis  K21.9 530.81 pantoprazole (PROTONIX) 40 mg tablet      DISCONTINUED: pantoprazole (PROTONIX) 40 mg tablet      6. Hyperlipidemia, unspecified hyperlipidemia type  E78.5 272.4 gemfibroziL (LOPID) 600 mg tablet      DISCONTINUED: gemfibroziL (LOPID) 600 mg tablet      7. Vitamin D deficiency  E55.9 268.9 cholecalciferol (VITAMIN D3) (1000 Units /25 mcg) tablet      DISCONTINUED: cholecalciferol (VITAMIN D3) (1000 Units /25 mcg) tablet          Explained to patient risk benefits of the medications. Advised patient to stop meds if having any side effects. Pt verbalized understanding of the instructions. I have discussed the diagnosis with the patient and the intended plan as seen in the above orders. The patient has received an after-visit summary and questions were answered concerning future plans. I have discussed medication side effects and warnings with the patient as well. I have reviewed the plan of care with the patient, accepted their input and they are in agreement with the treatment goals. Reviewed plan of care. Patient has provided input and agrees with goals.         Luis E Martinez MD

## 2022-10-13 NOTE — PROGRESS NOTES
Chief Complaint   Patient presents with    Elbow Pain     Follow up on right elbow pain         Visit Vitals  /72 (BP 1 Location: Left upper arm, BP Patient Position: Sitting)   Pulse 65   Temp 97 °F (36.1 °C) (Temporal)   Resp 18   Ht 5' 8\" (1.727 m)   Wt 183 lb (83 kg)   SpO2 97%   BMI 27.83 kg/m²        Health Maintenance Due   Topic    Shingrix Vaccine Age 50> (1 of 2)    COVID-19 Vaccine (4 - Booster for Soriano Peter series)    Flu Vaccine (1)        1. Have you been to the ER, urgent care clinic since your last visit? Hospitalized since your last visit? No    2. Have you seen or consulted any other health care providers outside of the [address] [place] [address] since your last visit? Include any pap smears or colon screening.  No

## 2023-01-13 DIAGNOSIS — E55.9 VITAMIN D DEFICIENCY: ICD-10-CM

## 2023-01-13 DIAGNOSIS — E53.8 B12 DEFICIENCY: ICD-10-CM

## 2023-01-13 DIAGNOSIS — R73.09 ELEVATED GLUCOSE: ICD-10-CM

## 2023-01-13 DIAGNOSIS — E03.9 ACQUIRED HYPOTHYROIDISM: ICD-10-CM

## 2023-01-13 DIAGNOSIS — E78.5 HYPERLIPIDEMIA, UNSPECIFIED HYPERLIPIDEMIA TYPE: ICD-10-CM

## 2023-01-14 LAB
25(OH)D3 SERPL-MCNC: 30.1 NG/ML (ref 30–100)
ALBUMIN SERPL-MCNC: 3.8 G/DL (ref 3.5–5)
ALBUMIN/GLOB SERPL: 1 (ref 1.1–2.2)
ALP SERPL-CCNC: 109 U/L (ref 45–117)
ALT SERPL-CCNC: 27 U/L (ref 12–78)
ANION GAP SERPL CALC-SCNC: 5 MMOL/L (ref 5–15)
AST SERPL-CCNC: 20 U/L (ref 15–37)
BASOPHILS # BLD: 0 K/UL (ref 0–0.1)
BASOPHILS NFR BLD: 1 % (ref 0–1)
BILIRUB SERPL-MCNC: 0.7 MG/DL (ref 0.2–1)
BUN SERPL-MCNC: 10 MG/DL (ref 6–20)
BUN/CREAT SERPL: 9 (ref 12–20)
CALCIUM SERPL-MCNC: 9.3 MG/DL (ref 8.5–10.1)
CHLORIDE SERPL-SCNC: 107 MMOL/L (ref 97–108)
CHOLEST SERPL-MCNC: 176 MG/DL
CO2 SERPL-SCNC: 27 MMOL/L (ref 21–32)
CREAT SERPL-MCNC: 1.11 MG/DL (ref 0.7–1.3)
DIFFERENTIAL METHOD BLD: ABNORMAL
EOSINOPHIL # BLD: 0.1 K/UL (ref 0–0.4)
EOSINOPHIL NFR BLD: 2 % (ref 0–7)
ERYTHROCYTE [DISTWIDTH] IN BLOOD BY AUTOMATED COUNT: 12.9 % (ref 11.5–14.5)
EST. AVERAGE GLUCOSE BLD GHB EST-MCNC: 120 MG/DL
FOLATE SERPL-MCNC: 4 NG/ML (ref 5–21)
GLOBULIN SER CALC-MCNC: 3.8 G/DL (ref 2–4)
GLUCOSE SERPL-MCNC: 98 MG/DL (ref 65–100)
HBA1C MFR BLD: 5.8 % (ref 4–5.6)
HCT VFR BLD AUTO: 50.7 % (ref 36.6–50.3)
HDLC SERPL-MCNC: 46 MG/DL
HDLC SERPL: 3.8 (ref 0–5)
HGB BLD-MCNC: 16.1 G/DL (ref 12.1–17)
IMM GRANULOCYTES # BLD AUTO: 0 K/UL (ref 0–0.04)
IMM GRANULOCYTES NFR BLD AUTO: 0 % (ref 0–0.5)
LDLC SERPL CALC-MCNC: 99.2 MG/DL (ref 0–100)
LYMPHOCYTES # BLD: 2.3 K/UL (ref 0.8–3.5)
LYMPHOCYTES NFR BLD: 36 % (ref 12–49)
MCH RBC QN AUTO: 29.1 PG (ref 26–34)
MCHC RBC AUTO-ENTMCNC: 31.8 G/DL (ref 30–36.5)
MCV RBC AUTO: 91.7 FL (ref 80–99)
MONOCYTES # BLD: 0.5 K/UL (ref 0–1)
MONOCYTES NFR BLD: 8 % (ref 5–13)
NEUTS SEG # BLD: 3.5 K/UL (ref 1.8–8)
NEUTS SEG NFR BLD: 53 % (ref 32–75)
NRBC # BLD: 0 K/UL (ref 0–0.01)
NRBC BLD-RTO: 0 PER 100 WBC
PLATELET # BLD AUTO: 152 K/UL (ref 150–400)
PMV BLD AUTO: 9.8 FL (ref 8.9–12.9)
POTASSIUM SERPL-SCNC: 4.2 MMOL/L (ref 3.5–5.1)
PROT SERPL-MCNC: 7.6 G/DL (ref 6.4–8.2)
RBC # BLD AUTO: 5.53 M/UL (ref 4.1–5.7)
SODIUM SERPL-SCNC: 139 MMOL/L (ref 136–145)
T4 FREE SERPL-MCNC: 1 NG/DL (ref 0.8–1.5)
TRIGL SERPL-MCNC: 154 MG/DL (ref ?–150)
TSH SERPL DL<=0.05 MIU/L-ACNC: 0.96 UIU/ML (ref 0.36–3.74)
VIT B12 SERPL-MCNC: 1468 PG/ML (ref 193–986)
VLDLC SERPL CALC-MCNC: 30.8 MG/DL
WBC # BLD AUTO: 6.5 K/UL (ref 4.1–11.1)

## 2023-01-15 LAB
IF BLOCK AB SER-ACNC: 1.1 AU/ML (ref 0–1.1)
PCA AB SER-ACNC: 41.3 UNITS (ref 0–20)

## 2023-01-19 ENCOUNTER — OFFICE VISIT (OUTPATIENT)
Dept: PRIMARY CARE CLINIC | Age: 53
End: 2023-01-19
Payer: COMMERCIAL

## 2023-01-19 VITALS
OXYGEN SATURATION: 100 % | HEIGHT: 68 IN | RESPIRATION RATE: 18 BRPM | HEART RATE: 78 BPM | TEMPERATURE: 97.5 F | WEIGHT: 187 LBS | DIASTOLIC BLOOD PRESSURE: 77 MMHG | SYSTOLIC BLOOD PRESSURE: 132 MMHG | BODY MASS INDEX: 28.34 KG/M2

## 2023-01-19 DIAGNOSIS — Z23 ENCOUNTER FOR IMMUNIZATION: ICD-10-CM

## 2023-01-19 DIAGNOSIS — E78.5 HYPERLIPIDEMIA, UNSPECIFIED HYPERLIPIDEMIA TYPE: ICD-10-CM

## 2023-01-19 DIAGNOSIS — R63.5 WEIGHT GAIN: ICD-10-CM

## 2023-01-19 DIAGNOSIS — E03.9 ACQUIRED HYPOTHYROIDISM: ICD-10-CM

## 2023-01-19 DIAGNOSIS — Z00.00 ANNUAL PHYSICAL EXAM: Primary | ICD-10-CM

## 2023-01-19 DIAGNOSIS — E55.9 VITAMIN D DEFICIENCY: ICD-10-CM

## 2023-01-19 DIAGNOSIS — R73.03 PREDIABETES: ICD-10-CM

## 2023-01-19 DIAGNOSIS — E53.8 FOLIC ACID DEFICIENCY: ICD-10-CM

## 2023-01-19 RX ORDER — GEMFIBROZIL 600 MG/1
600 TABLET, FILM COATED ORAL 2 TIMES DAILY
Qty: 180 TABLET | Refills: 1 | Status: SHIPPED | OUTPATIENT
Start: 2023-01-19

## 2023-01-19 RX ORDER — FOLIC ACID 1 MG/1
1 TABLET ORAL DAILY
Qty: 90 TABLET | Refills: 3 | Status: SHIPPED | OUTPATIENT
Start: 2023-01-19

## 2023-01-19 RX ORDER — ACETAMINOPHEN 500 MG
2000 TABLET ORAL 2 TIMES DAILY
Qty: 90 CAPSULE | Refills: 3 | Status: SHIPPED | OUTPATIENT
Start: 2023-01-19

## 2023-01-19 RX ORDER — LEVOTHYROXINE SODIUM 75 UG/1
75 TABLET ORAL
Qty: 90 TABLET | Refills: 1 | Status: SHIPPED | OUTPATIENT
Start: 2023-01-19

## 2023-01-19 RX ORDER — ZOSTER VACCINE RECOMBINANT, ADJUVANTED 50 MCG/0.5
0.5 KIT INTRAMUSCULAR ONCE
Qty: 0.5 ML | Refills: 0 | Status: SHIPPED | OUTPATIENT
Start: 2023-01-19 | End: 2023-01-19

## 2023-01-19 NOTE — PROGRESS NOTES
Lakeshia Combs is a 45 y.o.  male and presents with     No chief complaint on file. Pt is here for follow up. Wants to go over labs  Pt has been exercising   Takes B12 sublingual.  Has gained about 12 pounds over past year and half      No past medical history on file. No past surgical history on file. Current Outpatient Medications   Medication Sig    cholecalciferol (VITAMIN D3) (2,000 UNITS /50 MCG) cap capsule Take 1 Capsule by mouth two (2) times a day. levothyroxine (SYNTHROID) 75 mcg tablet Take 1 Tablet by mouth Daily (before breakfast). gemfibroziL (LOPID) 600 mg tablet Take 1 Tablet by mouth two (2) times a day. folic acid (FOLVITE) 1 mg tablet Take 1 Tablet by mouth daily. varicella-zoster recombinant, PF, (Shingrix, PF,) 50 mcg/0.5 mL susr injection 0.5 mL by IntraMUSCular route once for 1 dose. ibuprofen (MOTRIN) 400 mg tablet Take 1 Tablet by mouth every eight (8) hours as needed for Pain. ammonium lactate (AMLACTIN) 12 % topical cream Apply  to affected area two (2) times a day. rub in to affected area well    loratadine (Claritin) 10 mg tablet Take 1 Tablet by mouth daily. pantoprazole (PROTONIX) 40 mg tablet Take 1 Tablet by mouth daily. cyanocobalamin-cobamamide (B-12) 5,000-100 mcg sublingual tablet 1 Tab by SubLINGual route daily. No current facility-administered medications for this visit.      Health Maintenance   Topic Date Due    Shingles Vaccine (1 of 2) Never done    Flu Vaccine (1) 06/30/2023 (Originally 8/1/2022)    COVID-19 Vaccine (4 - Booster for [employer] series) 12/31/2024 (Originally 2/23/2022)    A1C test (Diabetic or Prediabetic)  01/13/2024    Depression Screen  01/19/2024    Lipid Screen  01/13/2028    Colorectal Cancer Screening Combo  10/08/2030    DTaP/Tdap/Td series (2 - Td or Tdap) 01/17/2032    Hepatitis C Screening  Completed    Pneumococcal 0-64 years  Aged [employer] History   Administered Date(s) Administered    COVID-19, PFIZER PURPLE top, DILUTE for use, (age 15 y+), IM, 30mcg/0.3mL 04/12/2021, 05/04/2021, 12/29/2021    Influenza Vaccine 11/11/2021    Influenza, FLUARIX, FLULAVAL, FLUZONE (age 10 mo+) AND AFLURIA, (age 1 y+), PF, 0.5mL 11/11/2021    Tdap 01/17/2022     No LMP for male patient. Allergies and Intolerances:   No Known Allergies    Family History:   Family History   Problem Relation Age of Onset    Diabetes Father        Social History:   He  reports that he has never smoked. He has never used smokeless tobacco.  He  reports no history of alcohol use.             Review of Systems:   General: negative for - chills, fatigue, fever, weight change  Psych: negative for - anxiety, depression, irritability or mood swings  ENT: negative for - headaches, hearing change, nasal congestion, oral lesions, sneezing or sore throat  Heme/ Lymph: negative for - bleeding problems, bruising, pallor or swollen lymph nodes  Endo: negative for - hot flashes, polydipsia/polyuria or temperature intolerance  Resp: negative for - cough, shortness of breath or wheezing  CV: negative for - chest pain, edema or palpitations  GI: negative for - abdominal pain, change in bowel habits, constipation, diarrhea or nausea/vomiting  : negative for - dysuria, hematuria, incontinence, pelvic pain or vulvar/vaginal symptoms  MSK: negative for - joint pain, joint swelling or muscle pain  Neuro: negative for - confusion, headaches, seizures or weakness  Derm: negative for - dry skin, hair changes, rash or skin lesion changes          Physical:   Vitals:   Vitals:    01/19/23 0757   BP: 132/77   Pulse: 78   Resp: 18   Temp: 97.5 °F (36.4 °C)   TempSrc: Temporal   SpO2: 100%   Weight: 187 lb (84.8 kg)   Height: 5' 8\" (1.727 m)           Exam:   HEENT- atraumatic,normocephalic, awake, oriented, well nourished  Neck - supple,no enlarged lymph nodes, no JVD, no thyromegaly  Chest- CTA, no rhonchi, no crackles  Heart- rrr, no murmurs / gallop/rub  Abdomen- soft,BS+,NT, no hepatosplenomegaly  Ext - no c/c/edema   Neuro- no focal deficits. Power 5/5 all extremities  Skin - warm,dry, no obvious rashes. Review of Data:   LABS:   Lab Results   Component Value Date/Time    WBC 6.5 01/13/2023 09:18 AM    HGB 16.1 01/13/2023 09:18 AM    HCT 50.7 (H) 01/13/2023 09:18 AM    PLATELET 649 16/67/2741 09:18 AM     Lab Results   Component Value Date/Time    Sodium 139 01/13/2023 09:18 AM    Potassium 4.2 01/13/2023 09:18 AM    Chloride 107 01/13/2023 09:18 AM    CO2 27 01/13/2023 09:18 AM    Glucose 98 01/13/2023 09:18 AM    BUN 10 01/13/2023 09:18 AM    Creatinine 1.11 01/13/2023 09:18 AM     Lab Results   Component Value Date/Time    Cholesterol, total 176 01/13/2023 09:18 AM    HDL Cholesterol 46 01/13/2023 09:18 AM    LDL, calculated 99.2 01/13/2023 09:18 AM    Triglyceride 154 (H) 01/13/2023 09:18 AM     No components found for: GPT        Impression / Plan:        ICD-10-CM ICD-9-CM    1. Annual physical exam  Z00.00 V70.0       2. Acquired hypothyroidism  E03.9 244.9 levothyroxine (SYNTHROID) 75 mcg tablet      3. Hyperlipidemia, unspecified hyperlipidemia type  E78.5 272.4 gemfibroziL (LOPID) 600 mg tablet      4. Vitamin D deficiency  E55.9 268.9 cholecalciferol (VITAMIN D3) (2,000 UNITS /50 MCG) cap capsule      5. Prediabetes  R73.03 790.29 HEMOGLOBIN A1C WITH EAG      6. Weight gain  R63.5 783.1       7. Folic acid deficiency  Z90.0 857.2 folic acid (FOLVITE) 1 mg tablet      VITAMIN B12 & FOLATE      8. Encounter for immunization  Z23 V03.89 varicella-zoster recombinant, PF, (Shingrix, PF,) 50 mcg/0.5 mL susr injection        Advised diet, exercise , wt loss of about 10-15 pounds. Explained to patient risk benefits of the medications. Advised patient to stop meds if having any side effects. Pt verbalized understanding of the instructions. I have discussed the diagnosis with the patient and the intended plan as seen in the above orders.   The patient has received an after-visit summary and questions were answered concerning future plans. I have discussed medication side effects and warnings with the patient as well. I have reviewed the plan of care with the patient, accepted their input and they are in agreement with the treatment goals. Reviewed plan of care. Patient has provided input and agrees with goals. Follow-up and Dispositions    Return in about 1 year (around 1/19/2024).          Lord Lit MD

## 2023-03-21 ENCOUNTER — TELEPHONE (OUTPATIENT)
Dept: PRIMARY CARE CLINIC | Age: 53
End: 2023-03-21

## 2023-03-21 NOTE — TELEPHONE ENCOUNTER
----- Message from Lane Daniel sent at 3/21/2023  2:45 PM EDT -----  Subject: Appointment Request    Reason for Call: Established Patient Appointment needed: Urgent (Patient   Request) ED Follow Up Visit    QUESTIONS    Reason for appointment request? Available appointments did not meet   patient need     Additional Information for Provider?  [facility] - negative for flu/covid & pneumonia - pt still suffering   from cough-wants to fup w/Dr Daniel Soto before April availability  ---------------------------------------------------------------------------  --------------  Jigar FELIX  [phone]; OK to respond with electronic message via [employer] portal (only   for patients who have registered [employer] account)  ---------------------------------------------------------------------------  --------------  SCRIPT ANSWERS  COVID Screen: Erick Rodriguez

## 2023-03-22 ENCOUNTER — OFFICE VISIT (OUTPATIENT)
Dept: PRIMARY CARE CLINIC | Age: 53
End: 2023-03-22
Payer: COMMERCIAL

## 2023-03-22 VITALS
SYSTOLIC BLOOD PRESSURE: 117 MMHG | RESPIRATION RATE: 18 BRPM | WEIGHT: 182 LBS | HEIGHT: 68 IN | DIASTOLIC BLOOD PRESSURE: 78 MMHG | TEMPERATURE: 98 F | BODY MASS INDEX: 27.58 KG/M2 | HEART RATE: 70 BPM | OXYGEN SATURATION: 100 %

## 2023-03-22 DIAGNOSIS — R05.9 COUGH, UNSPECIFIED TYPE: ICD-10-CM

## 2023-03-22 DIAGNOSIS — J06.9 VIRAL URI: Primary | ICD-10-CM

## 2023-03-22 PROCEDURE — 99213 OFFICE O/P EST LOW 20 MIN: CPT | Performed by: INTERNAL MEDICINE

## 2023-03-22 RX ORDER — CODEINE PHOSPHATE AND GUAIFENESIN 10; 100 MG/5ML; MG/5ML
10 SOLUTION ORAL
Qty: 70 ML | Refills: 0 | Status: SHIPPED | OUTPATIENT
Start: 2023-03-22 | End: 2023-03-29

## 2023-03-22 RX ORDER — BENZONATATE 100 MG/1
100 CAPSULE ORAL
COMMUNITY

## 2023-03-22 NOTE — PROGRESS NOTES
Chief Complaint   Patient presents with    Follow-up     Fever, chills, headache, runny nose  Covid test was negative  Flu negative'  No pneumonia         There were no vitals taken for this visit. 1. Have you been to the ER, urgent care clinic since your last visit? Hospitalized since your last visit? [facility] - all POC tests negative for illness    2. Have you seen or consulted any other health care providers outside of the [address] [place] [address] since your last visit? Include any pap smears or colon screening. [facility]       3. For patients aged 39-70: Has the patient had a colonoscopy / FIT/ Cologuard? Yes - Care Gap present.  Most recent result on file

## 2023-03-22 NOTE — PROGRESS NOTES
Ken Martinez is a 45 y.o.  male and presents with     Chief Complaint   Patient presents with    Follow-up     Fever, chills, headache, runny nose  Covid test was negative  Flu negative'  No pneumonia     Pt came back from [facility] on Wednesday. Thursday started having cough  Friday had fever , chills, runny nose  Friday night sever cough at night. [facility]  COVID rpaid test was neg ,, PCR was neg. FLu - was neg, CXR - was normal    Pt was prescribed tessalon  Pt still has cough. NO fever. Mild Headache and runny nose. SOme loose BMs  Was taking aleve for headache. No past medical history on file. No past surgical history on file. Current Outpatient Medications   Medication Sig    benzonatate (TESSALON) 100 mg capsule Take 100 mg by mouth three (3) times daily as needed for Cough. guaiFENesin-codeine (ROBITUSSIN AC) 100-10 mg/5 mL solution Take 10 mL by mouth nightly as needed for Cough for up to 7 days. Max Daily Amount: 10 mL. cholecalciferol (VITAMIN D3) (2,000 UNITS /50 MCG) cap capsule Take 1 Capsule by mouth two (2) times a day. levothyroxine (SYNTHROID) 75 mcg tablet Take 1 Tablet by mouth Daily (before breakfast). gemfibroziL (LOPID) 600 mg tablet Take 1 Tablet by mouth two (2) times a day. folic acid (FOLVITE) 1 mg tablet Take 1 Tablet by mouth daily. ibuprofen (MOTRIN) 400 mg tablet Take 1 Tablet by mouth every eight (8) hours as needed for Pain. ammonium lactate (AMLACTIN) 12 % topical cream Apply  to affected area two (2) times a day. rub in to affected area well    loratadine (Claritin) 10 mg tablet Take 1 Tablet by mouth daily. pantoprazole (PROTONIX) 40 mg tablet Take 1 Tablet by mouth daily. cyanocobalamin-cobamamide (B-12) 5,000-100 mcg sublingual tablet 1 Tab by SubLINGual route daily. No current facility-administered medications for this visit.      Health Maintenance   Topic Date Due    Shingles Vaccine (1 of 2) Never done COVID-19 Vaccine (4 - Booster for Pfizer series) 12/31/2024 (Originally 2/23/2022)    A1C test (Diabetic or Prediabetic)  01/13/2024    Depression Screen  01/19/2024    Lipid Screen  01/13/2028    Colorectal Cancer Screening Combo  10/08/2030    DTaP/Tdap/Td series (2 - Td or Tdap) 01/17/2032    Hepatitis C Screening  Completed    Flu Vaccine  Completed    Pneumococcal 0-64 years  Aged Out     Immunization History   Administered Date(s) Administered    COVID-19, PFIZER PURPLE top, DILUTE for use, (age 15 y+), IM, 30mcg/0.3mL 04/12/2021, 05/04/2021, 12/29/2021    Influenza Vaccine 11/11/2021    Influenza, FLUARIX, FLULAVAL, Janann Just (age 10 mo+) AND AFLURIA, (age 1 y+), PF, 0.5mL 11/11/2021, 01/19/2023    Tdap 01/17/2022     No LMP for male patient. Allergies and Intolerances:   No Known Allergies    Family History:   Family History   Problem Relation Age of Onset    Diabetes Father        Social History:   He  reports that he has never smoked. He has never used smokeless tobacco.  He  reports no history of alcohol use.             Review of Systems:   General: negative for - chills, fatigue, fever, weight change  Psych: negative for - anxiety, depression, irritability or mood swings  ENT: negative for - headaches, hearing change, nasal congestion, oral lesions, sneezing or sore throat  Heme/ Lymph: negative for - bleeding problems, bruising, pallor or swollen lymph nodes  Endo: negative for - hot flashes, polydipsia/polyuria or temperature intolerance  Resp: negative for - cough, shortness of breath or wheezing  CV: negative for - chest pain, edema or palpitations  GI: negative for - abdominal pain, change in bowel habits, constipation, diarrhea or nausea/vomiting  : negative for - dysuria, hematuria, incontinence, pelvic pain or vulvar/vaginal symptoms  MSK: negative for - joint pain, joint swelling or muscle pain  Neuro: negative for - confusion, headaches, seizures or weakness  Derm: negative for - dry skin, hair changes, rash or skin lesion changes          Physical:   Vitals:   Vitals:    03/22/23 1422   BP: 117/78   Pulse: 70   Resp: 18   Temp: 98 °F (36.7 °C)   TempSrc: Temporal   SpO2: 100%   Weight: 182 lb (82.6 kg)   Height: 5' 8\" (1.727 m)           Exam:   HEENT- atraumatic,normocephalic, awake, oriented, well nourished  Neck - supple,no enlarged lymph nodes, no JVD, no thyromegaly  Chest- CTA, no rhonchi, no crackles  Heart- rrr, no murmurs / gallop/rub  Abdomen- soft,BS+,NT, no hepatosplenomegaly  Ext - no c/c/edema   Neuro- no focal deficits. Power 5/5 all extremities  Skin - warm,dry, no obvious rashes. Review of Data:   LABS:   Lab Results   Component Value Date/Time    WBC 6.5 01/13/2023 09:18 AM    HGB 16.1 01/13/2023 09:18 AM    HCT 50.7 (H) 01/13/2023 09:18 AM    PLATELET 021 18/62/8500 09:18 AM     Lab Results   Component Value Date/Time    Sodium 139 01/13/2023 09:18 AM    Potassium 4.2 01/13/2023 09:18 AM    Chloride 107 01/13/2023 09:18 AM    CO2 27 01/13/2023 09:18 AM    Glucose 98 01/13/2023 09:18 AM    BUN 10 01/13/2023 09:18 AM    Creatinine 1.11 01/13/2023 09:18 AM     Lab Results   Component Value Date/Time    Cholesterol, total 176 01/13/2023 09:18 AM    HDL Cholesterol 46 01/13/2023 09:18 AM    LDL, calculated 99.2 01/13/2023 09:18 AM    Triglyceride 154 (H) 01/13/2023 09:18 AM     No components found for: GPT        Impression / Plan:        ICD-10-CM ICD-9-CM    1. Viral URI  J06.9 465.9 guaiFENesin-codeine (ROBITUSSIN AC) 100-10 mg/5 mL solution      2. Cough, unspecified type  R05.9 786.2 guaiFENesin-codeine (ROBITUSSIN AC) 100-10 mg/5 mL solution          Explained to patient risk benefits of the medications. Advised patient to stop meds if having any side effects. Pt verbalized understanding of the instructions. I have discussed the diagnosis with the patient and the intended plan as seen in the above orders.   The patient has received an after-visit summary and questions were answered concerning future plans. I have discussed medication side effects and warnings with the patient as well. I have reviewed the plan of care with the patient, accepted their input and they are in agreement with the treatment goals. Reviewed plan of care. Patient has provided input and agrees with goals. Follow-up and Dispositions    Return in about 6 months (around 9/22/2023).          Pawel Brandt MD

## 2023-05-25 RX ORDER — GEMFIBROZIL 600 MG/1
600 TABLET, FILM COATED ORAL 2 TIMES DAILY
COMMUNITY
Start: 2023-01-19

## 2023-05-25 RX ORDER — LORATADINE 10 MG/1
10 TABLET ORAL DAILY
COMMUNITY
Start: 2022-10-13

## 2023-05-25 RX ORDER — AMMONIUM LACTATE 12 G/100G
CREAM TOPICAL 2 TIMES DAILY
COMMUNITY
Start: 2022-10-13

## 2023-05-25 RX ORDER — LEVOTHYROXINE SODIUM 0.07 MG/1
75 TABLET ORAL
COMMUNITY
Start: 2023-01-19

## 2023-05-25 RX ORDER — BENZONATATE 100 MG/1
100 CAPSULE ORAL 3 TIMES DAILY PRN
COMMUNITY

## 2023-05-25 RX ORDER — IBUPROFEN 400 MG/1
400 TABLET ORAL EVERY 8 HOURS PRN
COMMUNITY
Start: 2022-10-13

## 2023-05-25 RX ORDER — PANTOPRAZOLE SODIUM 40 MG/1
40 TABLET, DELAYED RELEASE ORAL DAILY
COMMUNITY
Start: 2022-10-13

## 2023-05-25 RX ORDER — FOLIC ACID 1 MG/1
1 TABLET ORAL DAILY
COMMUNITY
Start: 2023-01-19

## 2023-09-13 ENCOUNTER — TELEPHONE (OUTPATIENT)
Dept: PRIMARY CARE CLINIC | Facility: CLINIC | Age: 53
End: 2023-09-13

## 2023-09-13 NOTE — TELEPHONE ENCOUNTER
----- Message from Micheal Justine sent at 9/13/2023 12:03 PM EDT -----  Subject: Appointment Request    Reason for Call: Established Patient Appointment needed: Semi-Routine   Cough, Cold Symptoms    QUESTIONS    Reason for appointment request? No appointments available during search     Additional Information for Provider? Pt has been experiencing a cold,   headache, and sore throat since Monday.  Please call to schedule an   appointment.   ---------------------------------------------------------------------------  --------------  Eliel [place] Jalen  [phone]; OK to leave message on voicemail  ---------------------------------------------------------------------------  --------------  SCRIPT ANSWERS

## 2023-09-13 NOTE — TELEPHONE ENCOUNTER
Spoke with patient and told him that we do not have any availability for sick visits this week. He mentioned he saw an urgent care yesterday and they were able to give him antibiotics. He tested negative for covid and strep but still feeling bad.  I told him he needs to let the medicine work before seeing dr Raul Peter but if symptoms do not get nay better over a couple days, the doctor will be happy to see him

## 2023-10-05 ENCOUNTER — OFFICE VISIT (OUTPATIENT)
Dept: PRIMARY CARE CLINIC | Facility: CLINIC | Age: 53
End: 2023-10-05
Payer: COMMERCIAL

## 2023-10-05 VITALS
BODY MASS INDEX: 27.28 KG/M2 | SYSTOLIC BLOOD PRESSURE: 114 MMHG | RESPIRATION RATE: 16 BRPM | DIASTOLIC BLOOD PRESSURE: 77 MMHG | HEIGHT: 68 IN | WEIGHT: 180 LBS | TEMPERATURE: 97.8 F | OXYGEN SATURATION: 97 % | HEART RATE: 69 BPM

## 2023-10-05 DIAGNOSIS — R05.9 COUGH, UNSPECIFIED TYPE: Primary | ICD-10-CM

## 2023-10-05 PROCEDURE — 99213 OFFICE O/P EST LOW 20 MIN: CPT | Performed by: INTERNAL MEDICINE

## 2023-10-05 RX ORDER — BENZONATATE 100 MG/1
100 CAPSULE ORAL 3 TIMES DAILY PRN
Qty: 30 CAPSULE | Refills: 1 | Status: SHIPPED | OUTPATIENT
Start: 2023-10-05

## 2023-10-05 SDOH — ECONOMIC STABILITY: HOUSING INSECURITY
IN THE LAST 12 MONTHS, WAS THERE A TIME WHEN YOU DID NOT HAVE A STEADY PLACE TO SLEEP OR SLEPT IN A SHELTER (INCLUDING NOW)?: PATIENT REFUSED

## 2023-10-05 SDOH — ECONOMIC STABILITY: FOOD INSECURITY: WITHIN THE PAST 12 MONTHS, YOU WORRIED THAT YOUR FOOD WOULD RUN OUT BEFORE YOU GOT MONEY TO BUY MORE.: PATIENT DECLINED

## 2023-10-05 SDOH — ECONOMIC STABILITY: INCOME INSECURITY: HOW HARD IS IT FOR YOU TO PAY FOR THE VERY BASICS LIKE FOOD, HOUSING, MEDICAL CARE, AND HEATING?: PATIENT DECLINED

## 2023-10-05 SDOH — ECONOMIC STABILITY: FOOD INSECURITY: WITHIN THE PAST 12 MONTHS, THE FOOD YOU BOUGHT JUST DIDN'T LAST AND YOU DIDN'T HAVE MONEY TO GET MORE.: PATIENT DECLINED

## 2023-10-05 ASSESSMENT — PATIENT HEALTH QUESTIONNAIRE - PHQ9
2. FEELING DOWN, DEPRESSED OR HOPELESS: 0
1. LITTLE INTEREST OR PLEASURE IN DOING THINGS: 0
SUM OF ALL RESPONSES TO PHQ QUESTIONS 1-9: 0
SUM OF ALL RESPONSES TO PHQ QUESTIONS 1-9: 0
SUM OF ALL RESPONSES TO PHQ9 QUESTIONS 1 & 2: 0
SUM OF ALL RESPONSES TO PHQ QUESTIONS 1-9: 0
SUM OF ALL RESPONSES TO PHQ QUESTIONS 1-9: 0

## 2023-10-05 NOTE — PROGRESS NOTES
Cate Woods is a 52 y.o.  male and presents with     Chief Complaint   Patient presents with    Follow-up    Cough     Pt had cough 3 weeks ago - COVID and FLU was neg. [facility]. Was given abx. Pt is having intermittent cough  Yesterday night had cough for 10 minutes. Tried inhaler. Pt going to [facility] on Saturday. History reviewed. No pertinent past medical history. No past surgical history on file. Current Outpatient Medications   Medication Sig    benzonatate (TESSALON) 100 MG capsule Take 1 capsule by mouth 3 times daily as needed for Cough (tid prn)    levothyroxine (SYNTHROID) 75 MCG tablet Take 1 tablet by mouth every morning (before breakfast)    gemfibrozil (LOPID) 600 MG tablet Take 1 tablet by mouth 2 times daily (Patient not taking: Reported on 10/5/2023)     No current facility-administered medications for this visit. Health Maintenance   Topic Date Due    Hepatitis B vaccine (1 of 3 - 3-dose series) Never done    Shingles vaccine (1 of 2) Never done    COVID-19 Vaccine (4 - Pfizer series) 02/23/2022    Flu vaccine (1) 08/01/2023    A1C test (Diabetic or Prediabetic)  01/13/2024    Depression Screen  01/19/2024    Lipids  01/13/2028    Colorectal Cancer Screen  10/08/2030    DTaP/Tdap/Td vaccine (2 - Td or Tdap) 01/17/2032    Hepatitis C screen  Completed    HIV screen  Completed    Hepatitis A vaccine  Aged Out    Hib vaccine  Aged Out    Meningococcal (ACWY) vaccine  Aged Out    Pneumococcal 0-64 years Vaccine  Aged [employer] History   Administered Date(s) Administered    COVID-19, PFIZER PURPLE top, DILUTE for use, (age 15 y+), 30mcg/0.3mL 04/12/2021, 05/04/2021, 12/29/2021    Influenza Virus Vaccine 11/11/2021    Influenza, FLUARIX, FLULAVAL, FLUZONE (age 10 mo+) AND AFLURIA, (age 1 y+), PF, 0.5mL 11/11/2021, 01/19/2023    TDaP, ADACEL (age 6y-58y), BOOSTRIX (age 10y+), IM, 0.5mL 01/17/2022     No LMP for male patient.         Allergies and Intolerances:   No

## 2023-12-15 RX ORDER — LEVOTHYROXINE SODIUM 0.07 MG/1
75 TABLET ORAL
Qty: 90 TABLET | Refills: 0 | Status: SHIPPED | OUTPATIENT
Start: 2023-12-15

## 2023-12-15 NOTE — TELEPHONE ENCOUNTER
PCP: Oxana Lucero MD    Last Visit 10/5/2023   Future Appointments   Date Time Provider [address]   1/29/2024  8:00 AM Alpesh Cain [place] [facility] [place] [facility]       Requested Prescriptions     Pending Prescriptions Disp Refills    levothyroxine (SYNTHROID) 75 MCG tablet [Pharmacy Med Name: Levothyroxine Sodium 75 MCG Oral Tablet] 90 tablet 0     Sig: TAKE 1 TABLET BY MOUTH ONCE DAILY BEFORE BREAKFAST         Other Comments: Last Refill 01/19/23

## 2024-02-12 DIAGNOSIS — Z00.00 ANNUAL PHYSICAL EXAM: ICD-10-CM

## 2024-02-12 DIAGNOSIS — E53.8 B12 DEFICIENCY: ICD-10-CM

## 2024-02-12 DIAGNOSIS — E55.9 VITAMIN D DEFICIENCY, UNSPECIFIED: ICD-10-CM

## 2024-02-12 DIAGNOSIS — R73.03 PREDIABETES: ICD-10-CM

## 2024-02-12 DIAGNOSIS — E03.9 HYPOTHYROIDISM, UNSPECIFIED TYPE: Primary | ICD-10-CM

## 2024-02-12 DIAGNOSIS — Z12.5 PROSTATE CANCER SCREENING: ICD-10-CM

## 2024-02-16 DIAGNOSIS — Z00.00 ANNUAL PHYSICAL EXAM: ICD-10-CM

## 2024-02-16 DIAGNOSIS — E55.9 VITAMIN D DEFICIENCY, UNSPECIFIED: ICD-10-CM

## 2024-02-16 DIAGNOSIS — Z12.5 PROSTATE CANCER SCREENING: ICD-10-CM

## 2024-02-16 DIAGNOSIS — E53.8 B12 DEFICIENCY: ICD-10-CM

## 2024-02-16 DIAGNOSIS — E03.9 HYPOTHYROIDISM, UNSPECIFIED TYPE: ICD-10-CM

## 2024-02-16 LAB
25(OH)D3 SERPL-MCNC: 16.9 NG/ML (ref 30–100)
ALBUMIN SERPL-MCNC: 3.8 G/DL (ref 3.5–5)
ALBUMIN/GLOB SERPL: 1 (ref 1.1–2.2)
ALP SERPL-CCNC: 122 U/L (ref 45–117)
ALT SERPL-CCNC: 24 U/L (ref 12–78)
ANION GAP SERPL CALC-SCNC: 3 MMOL/L (ref 5–15)
AST SERPL-CCNC: 21 U/L (ref 15–37)
BILIRUB SERPL-MCNC: 0.9 MG/DL (ref 0.2–1)
BUN SERPL-MCNC: 14 MG/DL (ref 6–20)
BUN/CREAT SERPL: 11 (ref 12–20)
CALCIUM SERPL-MCNC: 9.3 MG/DL (ref 8.5–10.1)
CHLORIDE SERPL-SCNC: 107 MMOL/L (ref 97–108)
CHOLEST SERPL-MCNC: 195 MG/DL
CO2 SERPL-SCNC: 29 MMOL/L (ref 21–32)
CREAT SERPL-MCNC: 1.3 MG/DL (ref 0.7–1.3)
ERYTHROCYTE [DISTWIDTH] IN BLOOD BY AUTOMATED COUNT: 13 % (ref 11.5–14.5)
EST. AVERAGE GLUCOSE BLD GHB EST-MCNC: 111 MG/DL
FOLATE SERPL-MCNC: 3.8 NG/ML (ref 5–21)
GLOBULIN SER CALC-MCNC: 3.9 G/DL (ref 2–4)
GLUCOSE SERPL-MCNC: 101 MG/DL (ref 65–100)
HBA1C MFR BLD: 5.5 % (ref 4–5.6)
HCT VFR BLD AUTO: 49.3 % (ref 36.6–50.3)
HDLC SERPL-MCNC: 53 MG/DL
HDLC SERPL: 3.7 (ref 0–5)
HGB BLD-MCNC: 16.5 G/DL (ref 12.1–17)
LDLC SERPL CALC-MCNC: 111.4 MG/DL (ref 0–100)
MCH RBC QN AUTO: 30.2 PG (ref 26–34)
MCHC RBC AUTO-ENTMCNC: 33.5 G/DL (ref 30–36.5)
MCV RBC AUTO: 90.3 FL (ref 80–99)
NRBC # BLD: 0 K/UL (ref 0–0.01)
NRBC BLD-RTO: 0 PER 100 WBC
PLATELET # BLD AUTO: 158 K/UL (ref 150–400)
PMV BLD AUTO: 9.7 FL (ref 8.9–12.9)
POTASSIUM SERPL-SCNC: 4.5 MMOL/L (ref 3.5–5.1)
PROT SERPL-MCNC: 7.7 G/DL (ref 6.4–8.2)
PSA SERPL-MCNC: 1 NG/ML (ref 0.01–4)
RBC # BLD AUTO: 5.46 M/UL (ref 4.1–5.7)
SODIUM SERPL-SCNC: 139 MMOL/L (ref 136–145)
T4 FREE SERPL-MCNC: 0.9 NG/DL (ref 0.8–1.5)
TRIGL SERPL-MCNC: 153 MG/DL
TSH SERPL DL<=0.05 MIU/L-ACNC: 3.24 UIU/ML (ref 0.36–3.74)
VIT B12 SERPL-MCNC: 480 PG/ML (ref 193–986)
VLDLC SERPL CALC-MCNC: 30.6 MG/DL
WBC # BLD AUTO: 6.6 K/UL (ref 4.1–11.1)

## 2024-02-18 LAB — PCA AB SER-ACNC: 59.6 UNITS (ref 0–20)

## 2024-02-21 ENCOUNTER — OFFICE VISIT (OUTPATIENT)
Dept: PRIMARY CARE CLINIC | Facility: CLINIC | Age: 54
End: 2024-02-21
Payer: COMMERCIAL

## 2024-02-21 VITALS
OXYGEN SATURATION: 99 % | RESPIRATION RATE: 20 BRPM | SYSTOLIC BLOOD PRESSURE: 135 MMHG | TEMPERATURE: 98 F | WEIGHT: 188 LBS | BODY MASS INDEX: 28.49 KG/M2 | DIASTOLIC BLOOD PRESSURE: 86 MMHG | HEART RATE: 56 BPM | HEIGHT: 68 IN

## 2024-02-21 DIAGNOSIS — E78.00 HYPERCHOLESTEREMIA: ICD-10-CM

## 2024-02-21 DIAGNOSIS — R73.03 PREDIABETES: ICD-10-CM

## 2024-02-21 DIAGNOSIS — E03.9 HYPOTHYROIDISM, UNSPECIFIED TYPE: Primary | ICD-10-CM

## 2024-02-21 DIAGNOSIS — D51.0 PERNICIOUS ANEMIA: ICD-10-CM

## 2024-02-21 DIAGNOSIS — Z00.00 ANNUAL PHYSICAL EXAM: ICD-10-CM

## 2024-02-21 DIAGNOSIS — Z23 NEED FOR VACCINATION: ICD-10-CM

## 2024-02-21 DIAGNOSIS — E55.9 VITAMIN D DEFICIENCY: ICD-10-CM

## 2024-02-21 DIAGNOSIS — Z12.5 PROSTATE CANCER SCREENING: ICD-10-CM

## 2024-02-21 DIAGNOSIS — E53.8 FOLIC ACID DEFICIENCY: ICD-10-CM

## 2024-02-21 PROCEDURE — 99396 PREV VISIT EST AGE 40-64: CPT | Performed by: INTERNAL MEDICINE

## 2024-02-21 RX ORDER — FOLIC ACID 1 MG/1
1 TABLET ORAL DAILY
Qty: 90 TABLET | Refills: 3 | Status: SHIPPED | OUTPATIENT
Start: 2024-02-21

## 2024-02-21 RX ORDER — LEVOTHYROXINE SODIUM 0.07 MG/1
75 TABLET ORAL
Qty: 90 TABLET | Refills: 3 | Status: SHIPPED | OUTPATIENT
Start: 2024-02-21

## 2024-02-21 RX ORDER — ZOSTER VACCINE RECOMBINANT, ADJUVANTED 50 MCG/0.5
0.5 KIT INTRAMUSCULAR ONCE
Qty: 1 EACH | Refills: 0 | Status: SHIPPED | OUTPATIENT
Start: 2024-02-21 | End: 2024-02-21

## 2024-02-21 RX ORDER — ERGOCALCIFEROL 1.25 MG/1
50000 CAPSULE ORAL WEEKLY
Qty: 12 CAPSULE | Refills: 1 | Status: SHIPPED | OUTPATIENT
Start: 2024-02-21

## 2024-02-21 ASSESSMENT — PATIENT HEALTH QUESTIONNAIRE - PHQ9
SUM OF ALL RESPONSES TO PHQ9 QUESTIONS 1 & 2: 0
2. FEELING DOWN, DEPRESSED OR HOPELESS: 0
SUM OF ALL RESPONSES TO PHQ QUESTIONS 1-9: 0
1. LITTLE INTEREST OR PLEASURE IN DOING THINGS: 0
SUM OF ALL RESPONSES TO PHQ QUESTIONS 1-9: 0

## 2024-02-21 NOTE — PROGRESS NOTES
Cecy Suggs is a 53 y.o.  male and presents with     Chief Complaint   Patient presents with    Annual Exam     Patient is here for routine physical.  Lab work indicates he is low vitamin D and folic acid levels  Parietal cell antibody levels are normal  Hemoglobin A1c has improved.  Patient complains of knee pain.  He has gained weight since last visit        No past medical history on file.  No past surgical history on file.  Current Outpatient Medications   Medication Sig    folic acid (FOLVITE) 1 MG tablet Take 1 tablet by mouth daily    vitamin D (ERGOCALCIFEROL) 1.25 MG (25367 UT) CAPS capsule Take 1 capsule by mouth once a week    levothyroxine (SYNTHROID) 75 MCG tablet Take 1 tablet by mouth every morning (before breakfast)    zoster recombinant adjuvanted vaccine (SHINGRIX) 50 MCG/0.5ML SUSR injection Inject 0.5 mLs into the muscle once for 1 dose     No current facility-administered medications for this visit.     Health Maintenance   Topic Date Due    Shingles vaccine (1 of 2) Never done    Flu vaccine (1) 08/01/2023    COVID-19 Vaccine (4 - 2023-24 season) 09/01/2023    Hepatitis B vaccine (1 of 3 - 3-dose series) 02/21/2025 (Originally 1970)    Depression Screen  10/05/2024    Diabetes screen  02/16/2027    Lipids  02/16/2029    Colorectal Cancer Screen  10/08/2030    DTaP/Tdap/Td vaccine (2 - Td or Tdap) 01/17/2032    Hepatitis C screen  Completed    HIV screen  Completed    Hepatitis A vaccine  Aged Out    Hib vaccine  Aged Out    Polio vaccine  Aged Out    Meningococcal (ACWY) vaccine  Aged Out    Pneumococcal 0-64 years Vaccine  Aged Out    A1C test (Diabetic or Prediabetic)  Discontinued     Immunization History   Administered Date(s) Administered    COVID-19, PFIZER PURPLE top, DILUTE for use, (age 12 y+), 30mcg/0.3mL 04/12/2021, 05/04/2021, 12/29/2021    Influenza Virus Vaccine 11/11/2021    Influenza, FLUARIX, FLULAVAL, FLUZONE (age 6 mo+) AND AFLURIA, (age 3 y+), PF, 0.5mL

## 2024-02-21 NOTE — PROGRESS NOTES
\"Have you been to the ER, urgent care clinic since your last visit?  Hospitalized since your last visit?\"    NO    “Have you seen or consulted any other health care providers outside of [facility] since your last visit?”    NO      Having knee pain when walking up steps

## 2024-04-28 ENCOUNTER — PATIENT MESSAGE (OUTPATIENT)
Dept: PRIMARY CARE CLINIC | Facility: CLINIC | Age: 54
End: 2024-04-28

## 2024-04-28 DIAGNOSIS — E55.9 VITAMIN D DEFICIENCY: ICD-10-CM

## 2024-04-29 RX ORDER — AMMONIUM LACTATE 12 G/100G
CREAM TOPICAL PRN
Qty: 385 G | Refills: 3 | Status: SHIPPED | OUTPATIENT
Start: 2024-04-29

## 2024-04-29 RX ORDER — AMMONIUM LACTATE 12 G/100G
CREAM TOPICAL PRN
COMMUNITY
End: 2024-04-29 | Stop reason: SDUPTHER

## 2024-04-29 RX ORDER — ERGOCALCIFEROL 1.25 MG/1
50000 CAPSULE ORAL WEEKLY
Qty: 12 CAPSULE | Refills: 1 | Status: SHIPPED | OUTPATIENT
Start: 2024-04-29

## 2024-04-29 NOTE — TELEPHONE ENCOUNTER
From: Cecy Suggs  To: Dr. Deep Cain  Sent: 4/28/2024 9:59 AM EDT  Subject: Ammonium lactate Cream 12% - refill    Hel Dr. Cain, Till last year you prescribed Ammonium lactate Cream 12% for my dry legs. I think this medication was not carried from [facility] old chart and so I don't see this medication under my new chart medication list. Please send a prescription for this medication to [facility] pharmacy.

## 2025-01-04 DIAGNOSIS — E55.9 VITAMIN D DEFICIENCY: ICD-10-CM

## 2025-01-06 RX ORDER — ERGOCALCIFEROL 1.25 MG/1
50000 CAPSULE, LIQUID FILLED ORAL WEEKLY
Qty: 12 CAPSULE | Refills: 0 | Status: SHIPPED | OUTPATIENT
Start: 2025-01-06

## 2025-02-13 ENCOUNTER — TELEPHONE (OUTPATIENT)
Dept: PRIMARY CARE CLINIC | Facility: CLINIC | Age: 55
End: 2025-02-13

## 2025-02-13 NOTE — TELEPHONE ENCOUNTER
----- Message from Neri OCHOA sent at 2/13/2025  1:35 PM EST -----  Regarding: [facility] Appointment Request  [facility] Appointment Request    Patient needs appointment for [facility] Appointment Type: Annual Visit.    Patient Requested Dates(s): After March 20, 2025  Patient Requested Time: early in the morning  Provider Name: Deep Cain MD    Reason for Appointment Request: Established Patient - Available appointments did not meet patient need / patient wanted to reschedule his Annual Wellness visit on February 28 because the patient is going out of town.  --------------------------------------------------------------------------------------------------------------------------    Relationship to Patient: Self     Call Back Information: OK to leave message on voicemail  Preferred Call Back Number: Phone : [phone]

## 2025-03-26 DIAGNOSIS — E55.9 VITAMIN D DEFICIENCY: Primary | ICD-10-CM

## 2025-03-26 DIAGNOSIS — D51.0 PERNICIOUS ANEMIA: ICD-10-CM

## 2025-03-26 DIAGNOSIS — E53.8 FOLIC ACID DEFICIENCY: ICD-10-CM

## 2025-03-26 DIAGNOSIS — E03.9 HYPOTHYROIDISM, UNSPECIFIED TYPE: ICD-10-CM

## 2025-03-26 DIAGNOSIS — Z12.5 PROSTATE CANCER SCREENING: ICD-10-CM

## 2025-03-26 DIAGNOSIS — R73.03 PREDIABETES: ICD-10-CM

## 2025-03-26 DIAGNOSIS — Z00.00 ANNUAL PHYSICAL EXAM: ICD-10-CM

## 2025-03-28 DIAGNOSIS — Z12.5 PROSTATE CANCER SCREENING: ICD-10-CM

## 2025-03-28 DIAGNOSIS — Z00.00 ANNUAL PHYSICAL EXAM: ICD-10-CM

## 2025-03-28 DIAGNOSIS — E55.9 VITAMIN D DEFICIENCY: ICD-10-CM

## 2025-03-28 DIAGNOSIS — E03.9 HYPOTHYROIDISM, UNSPECIFIED TYPE: ICD-10-CM

## 2025-03-28 DIAGNOSIS — E53.8 FOLIC ACID DEFICIENCY: ICD-10-CM

## 2025-03-28 DIAGNOSIS — D51.0 PERNICIOUS ANEMIA: ICD-10-CM

## 2025-03-29 ENCOUNTER — RESULTS FOLLOW-UP (OUTPATIENT)
Dept: PRIMARY CARE CLINIC | Facility: CLINIC | Age: 55
End: 2025-03-29

## 2025-03-29 LAB
25(OH)D3 SERPL-MCNC: 55.1 NG/ML (ref 30–100)
ALBUMIN SERPL-MCNC: 3.9 G/DL (ref 3.5–5)
ALBUMIN/GLOB SERPL: 1 (ref 1.1–2.2)
ALP SERPL-CCNC: 131 U/L (ref 45–117)
ALT SERPL-CCNC: 22 U/L (ref 12–78)
ANION GAP SERPL CALC-SCNC: 5 MMOL/L (ref 2–12)
APPEARANCE UR: CLEAR
AST SERPL-CCNC: 19 U/L (ref 15–37)
BILIRUB SERPL-MCNC: 1.1 MG/DL (ref 0.2–1)
BILIRUB UR QL: NEGATIVE
BUN SERPL-MCNC: 13 MG/DL (ref 6–20)
BUN/CREAT SERPL: 10 (ref 12–20)
CALCIUM SERPL-MCNC: 9.4 MG/DL (ref 8.5–10.1)
CHLORIDE SERPL-SCNC: 108 MMOL/L (ref 97–108)
CHOLEST SERPL-MCNC: 202 MG/DL
CO2 SERPL-SCNC: 27 MMOL/L (ref 21–32)
COLOR UR: NORMAL
CREAT SERPL-MCNC: 1.28 MG/DL (ref 0.7–1.3)
ERYTHROCYTE [DISTWIDTH] IN BLOOD BY AUTOMATED COUNT: 12.7 % (ref 11.5–14.5)
EST. AVERAGE GLUCOSE BLD GHB EST-MCNC: 117 MG/DL
FOLATE SERPL-MCNC: 9.8 NG/ML (ref 5–21)
GLOBULIN SER CALC-MCNC: 4 G/DL (ref 2–4)
GLUCOSE SERPL-MCNC: 105 MG/DL (ref 65–100)
GLUCOSE UR STRIP.AUTO-MCNC: NEGATIVE MG/DL
HBA1C MFR BLD: 5.7 % (ref 4–5.6)
HCT VFR BLD AUTO: 49.8 % (ref 36.6–50.3)
HDLC SERPL-MCNC: 56 MG/DL
HDLC SERPL: 3.6 (ref 0–5)
HGB BLD-MCNC: 16.9 G/DL (ref 12.1–17)
HGB UR QL STRIP: NEGATIVE
KETONES UR QL STRIP.AUTO: NEGATIVE MG/DL
LDLC SERPL CALC-MCNC: 111.4 MG/DL (ref 0–100)
LEUKOCYTE ESTERASE UR QL STRIP.AUTO: NEGATIVE
MCH RBC QN AUTO: 30.7 PG (ref 26–34)
MCHC RBC AUTO-ENTMCNC: 33.9 G/DL (ref 30–36.5)
MCV RBC AUTO: 90.4 FL (ref 80–99)
NITRITE UR QL STRIP.AUTO: NEGATIVE
NRBC # BLD: 0 K/UL (ref 0–0.01)
NRBC BLD-RTO: 0 PER 100 WBC
PH UR STRIP: 6 (ref 5–8)
PLATELET # BLD AUTO: 188 K/UL (ref 150–400)
PMV BLD AUTO: 10.4 FL (ref 8.9–12.9)
POTASSIUM SERPL-SCNC: 4.5 MMOL/L (ref 3.5–5.1)
PROT SERPL-MCNC: 7.9 G/DL (ref 6.4–8.2)
PROT UR STRIP-MCNC: NEGATIVE MG/DL
PSA SERPL-MCNC: 1.4 NG/ML (ref 0.01–4)
RBC # BLD AUTO: 5.51 M/UL (ref 4.1–5.7)
SODIUM SERPL-SCNC: 140 MMOL/L (ref 136–145)
SP GR UR REFRACTOMETRY: 1.01 (ref 1–1.03)
SPECIMEN HOLD: NORMAL
T4 FREE SERPL-MCNC: 0.9 NG/DL (ref 0.8–1.5)
TRIGL SERPL-MCNC: 173 MG/DL
TSH SERPL DL<=0.05 MIU/L-ACNC: 7.59 UIU/ML (ref 0.36–3.74)
UROBILINOGEN UR QL STRIP.AUTO: 0.2 EU/DL (ref 0.2–1)
VIT B12 SERPL-MCNC: 1074 PG/ML (ref 193–986)
VLDLC SERPL CALC-MCNC: 34.6 MG/DL
WBC # BLD AUTO: 7.2 K/UL (ref 4.1–11.1)

## 2025-03-31 LAB — PCA AB SER-ACNC: 92.1 UNITS (ref 0–20)

## 2025-04-03 ENCOUNTER — OFFICE VISIT (OUTPATIENT)
Dept: PRIMARY CARE CLINIC | Facility: CLINIC | Age: 55
End: 2025-04-03
Payer: COMMERCIAL

## 2025-04-03 VITALS
SYSTOLIC BLOOD PRESSURE: 125 MMHG | HEIGHT: 69 IN | WEIGHT: 188 LBS | RESPIRATION RATE: 18 BRPM | DIASTOLIC BLOOD PRESSURE: 64 MMHG | OXYGEN SATURATION: 98 % | BODY MASS INDEX: 27.85 KG/M2 | HEART RATE: 71 BPM | TEMPERATURE: 97.1 F

## 2025-04-03 DIAGNOSIS — Z80.0 FAMILY HISTORY OF COLON CANCER: ICD-10-CM

## 2025-04-03 DIAGNOSIS — E78.00 HYPERCHOLESTEREMIA: ICD-10-CM

## 2025-04-03 DIAGNOSIS — E55.9 VITAMIN D DEFICIENCY: ICD-10-CM

## 2025-04-03 DIAGNOSIS — Z12.11 COLON CANCER SCREENING: ICD-10-CM

## 2025-04-03 DIAGNOSIS — E53.8 FOLIC ACID DEFICIENCY: ICD-10-CM

## 2025-04-03 DIAGNOSIS — D51.0 PERNICIOUS ANEMIA: ICD-10-CM

## 2025-04-03 DIAGNOSIS — Z23 NEED FOR VACCINATION: ICD-10-CM

## 2025-04-03 DIAGNOSIS — R73.03 PREDIABETES: ICD-10-CM

## 2025-04-03 DIAGNOSIS — Z00.00 ANNUAL PHYSICAL EXAM: Primary | ICD-10-CM

## 2025-04-03 DIAGNOSIS — Z13.6 SCREENING FOR ISCHEMIC HEART DISEASE: ICD-10-CM

## 2025-04-03 DIAGNOSIS — E03.9 HYPOTHYROIDISM, UNSPECIFIED TYPE: ICD-10-CM

## 2025-04-03 PROCEDURE — 99396 PREV VISIT EST AGE 40-64: CPT | Performed by: INTERNAL MEDICINE

## 2025-04-03 RX ORDER — ZOSTER VACCINE RECOMBINANT, ADJUVANTED 50 MCG/0.5
0.5 KIT INTRAMUSCULAR ONCE
Qty: 1 EACH | Refills: 0 | Status: SHIPPED | OUTPATIENT
Start: 2025-04-03 | End: 2025-04-03

## 2025-04-03 RX ORDER — LEVOTHYROXINE SODIUM 75 UG/1
75 TABLET ORAL
Qty: 90 TABLET | Refills: 3 | Status: SHIPPED | OUTPATIENT
Start: 2025-04-03

## 2025-04-03 RX ORDER — FOLIC ACID 1 MG/1
1 TABLET ORAL DAILY
Qty: 90 TABLET | Refills: 3 | Status: SHIPPED | OUTPATIENT
Start: 2025-04-03

## 2025-04-03 RX ORDER — AMMONIUM LACTATE 12 G/100G
CREAM TOPICAL PRN
Qty: 385 G | Refills: 3 | Status: SHIPPED | OUTPATIENT
Start: 2025-04-03

## 2025-04-03 SDOH — ECONOMIC STABILITY: FOOD INSECURITY: WITHIN THE PAST 12 MONTHS, YOU WORRIED THAT YOUR FOOD WOULD RUN OUT BEFORE YOU GOT MONEY TO BUY MORE.: NEVER TRUE

## 2025-04-03 SDOH — ECONOMIC STABILITY: FOOD INSECURITY: WITHIN THE PAST 12 MONTHS, THE FOOD YOU BOUGHT JUST DIDN'T LAST AND YOU DIDN'T HAVE MONEY TO GET MORE.: NEVER TRUE

## 2025-04-03 ASSESSMENT — PATIENT HEALTH QUESTIONNAIRE - PHQ9
2. FEELING DOWN, DEPRESSED OR HOPELESS: NOT AT ALL
SUM OF ALL RESPONSES TO PHQ QUESTIONS 1-9: 0
1. LITTLE INTEREST OR PLEASURE IN DOING THINGS: NOT AT ALL
SUM OF ALL RESPONSES TO PHQ QUESTIONS 1-9: 0

## 2025-04-03 NOTE — PROGRESS NOTES
Cecy Suggs is a 54 y.o. male and presents with     Chief Complaint   Patient presents with    Annual Exam    Immunizations       History of Present Illness  The patient presents for evaluation of prediabetes, hypercholesterolemia, pernicious anemia, hypothyroidism, and health maintenance.    Weight fluctuations noted, with recent cholesterol increase by 5-7 points. No cholesterol-lowering medication taken. Family history of heart disease in father.    History of pernicious anemia with normal platelet count on previous referral to Dr. Rojas. Taking B12 and folic acid supplements; inquired about reducing folic acid dosage.    Levothyroxine taken for thyroid management, but missed last 3 weeks due to travel to [facility].    Since returning from [facility], experienced left ear and throat pain. Diagnosed with ear pressure; prescribed cetirizine. Reported decreased foot pain and knee pain during ambulation.    Expressed interest in shingles and pneumonia vaccines. Colonoscopy in 2020; concerned about sister's colon cancer diagnosis at age 45-46.    FAMILY HISTORY  Father had heart disease. Sister diagnosed with colon cancer around age 45-46.         History reviewed. No pertinent past medical history.  No past surgical history on file.  Current Outpatient Medications   Medication Sig    zoster recombinant adjuvanted vaccine (SHINGRIX) 50 MCG/0.5ML SUSR injection Inject 0.5 mLs into the muscle once for 1 dose    pneumococcal 20-valent conjugat (PREVNAR) 0.5 ML WILDER inj Inject 0.5 mLs into the muscle once for 1 dose    ammonium lactate (AMLACTIN) 12 % cream Apply topically as needed for Dry Skin Apply topically as needed.    levothyroxine (SYNTHROID) 75 MCG tablet Take 1 tablet by mouth every morning (before breakfast)    folic acid (FOLVITE) 1 MG tablet Take 1 tablet by mouth daily    vitamin D (ERGOCALCIFEROL) 1.25 MG (30067 UT) CAPS capsule Take 1 capsule by mouth once a week     No current facility-administered

## 2025-04-03 NOTE — PROGRESS NOTES
\"Have you been to the ER, urgent care clinic since your last visit?  Hospitalized since your last visit?\"    YES - When: approximately 10 days ago.  Where and Why: Patient Fist sore throat .    “Have you seen or consulted any other health care providers outside our system since your last visit?”    NO

## 2025-05-08 ENCOUNTER — TELEPHONE (OUTPATIENT)
Dept: PRIMARY CARE CLINIC | Facility: CLINIC | Age: 55
End: 2025-05-08

## 2025-05-08 NOTE — TELEPHONE ENCOUNTER
Patient had a CT Scan done by Michael Gordon and a Colonoscopy done by Carlton Lozano.  He is calling to find out if we have received the reports from those tests.  [facility] is where he had them done.

## 2025-05-08 NOTE — TELEPHONE ENCOUNTER
Spoke to pt aware that his my chart messages were sent to Dr Cain and that at this time we have not received any results when his colonoscopy was done or ct scan. Pt expressed understanding.